# Patient Record
Sex: FEMALE | Race: BLACK OR AFRICAN AMERICAN | Employment: UNEMPLOYED | URBAN - METROPOLITAN AREA
[De-identification: names, ages, dates, MRNs, and addresses within clinical notes are randomized per-mention and may not be internally consistent; named-entity substitution may affect disease eponyms.]

---

## 2020-05-03 ENCOUNTER — HOSPITAL ENCOUNTER (EMERGENCY)
Age: 35
Discharge: HOME | End: 2020-05-03
Payer: COMMERCIAL

## 2020-05-03 ENCOUNTER — APPOINTMENT (OUTPATIENT)
Dept: GENERAL RADIOLOGY | Age: 35
End: 2020-05-03
Payer: COMMERCIAL

## 2020-05-03 VITALS
TEMPERATURE: 98.1 F | OXYGEN SATURATION: 97 % | SYSTOLIC BLOOD PRESSURE: 148 MMHG | BODY MASS INDEX: 38.45 KG/M2 | HEIGHT: 67 IN | WEIGHT: 245 LBS | RESPIRATION RATE: 19 BRPM | DIASTOLIC BLOOD PRESSURE: 84 MMHG | HEART RATE: 88 BPM

## 2020-05-03 PROCEDURE — 29515 APPLICATION SHORT LEG SPLINT: CPT

## 2020-05-03 PROCEDURE — 73060 X-RAY EXAM OF HUMERUS: CPT

## 2020-05-03 PROCEDURE — 99283 EMERGENCY DEPT VISIT LOW MDM: CPT

## 2020-05-03 PROCEDURE — 73630 X-RAY EXAM OF FOOT: CPT

## 2020-05-03 PROCEDURE — 6360000002 HC RX W HCPCS: Performed by: PHYSICIAN ASSISTANT

## 2020-05-03 PROCEDURE — 96372 THER/PROPH/DIAG INJ SC/IM: CPT

## 2020-05-03 PROCEDURE — 73552 X-RAY EXAM OF FEMUR 2/>: CPT

## 2020-05-03 PROCEDURE — 73610 X-RAY EXAM OF ANKLE: CPT

## 2020-05-03 RX ORDER — CYCLOBENZAPRINE HCL 10 MG
10 TABLET ORAL 3 TIMES DAILY PRN
Qty: 21 TABLET | Refills: 0 | Status: SHIPPED | OUTPATIENT
Start: 2020-05-03 | End: 2020-05-13

## 2020-05-03 RX ORDER — ETODOLAC 400 MG/1
400 TABLET, FILM COATED ORAL 2 TIMES DAILY
Qty: 14 TABLET | Refills: 0 | Status: ON HOLD | OUTPATIENT
Start: 2020-05-03 | End: 2021-01-01

## 2020-05-03 RX ORDER — ORPHENADRINE CITRATE 30 MG/ML
60 INJECTION INTRAMUSCULAR; INTRAVENOUS ONCE
Status: COMPLETED | OUTPATIENT
Start: 2020-05-03 | End: 2020-05-03

## 2020-05-03 RX ORDER — KETOROLAC TROMETHAMINE 30 MG/ML
60 INJECTION, SOLUTION INTRAMUSCULAR; INTRAVENOUS ONCE
Status: COMPLETED | OUTPATIENT
Start: 2020-05-03 | End: 2020-05-03

## 2020-05-03 RX ADMIN — ORPHENADRINE CITRATE 60 MG: 30 INJECTION INTRAMUSCULAR; INTRAVENOUS at 16:32

## 2020-05-03 RX ADMIN — KETOROLAC TROMETHAMINE 60 MG: 30 INJECTION, SOLUTION INTRAMUSCULAR at 16:31

## 2020-05-03 ASSESSMENT — ENCOUNTER SYMPTOMS
ABDOMINAL PAIN: 0
EYE PAIN: 0
SHORTNESS OF BREATH: 0
BACK PAIN: 0
TROUBLE SWALLOWING: 0
APNEA: 0
ALLERGIC/IMMUNOLOGIC NEGATIVE: 1
COLOR CHANGE: 0

## 2020-05-03 ASSESSMENT — PAIN SCALES - GENERAL
PAINLEVEL_OUTOF10: 7
PAINLEVEL_OUTOF10: 8

## 2020-05-03 ASSESSMENT — PAIN DESCRIPTION - LOCATION: LOCATION: ARM;BACK;LEG

## 2020-05-03 ASSESSMENT — PAIN DESCRIPTION - ORIENTATION: ORIENTATION: RIGHT

## 2020-05-03 NOTE — ED PROVIDER NOTES
Vitals [05/03/20 1609]   BP Temp Temp Source Pulse Resp SpO2 Height Weight   (!) 148/84 98.1 °F (36.7 °C) Temporal 88 19 97 % 5' 7\" (1.702 m) 245 lb (111.1 kg)       Physical Exam  Vitals signs and nursing note reviewed. Constitutional:       General: She is not in acute distress. Appearance: She is well-developed. She is not diaphoretic. HENT:      Head: Normocephalic and atraumatic. Mouth/Throat:      Pharynx: No oropharyngeal exudate. Eyes:      General: No scleral icterus. Conjunctiva/sclera: Conjunctivae normal.      Pupils: Pupils are equal, round, and reactive to light. Neck:      Musculoskeletal: Normal range of motion and neck supple. Trachea: No tracheal deviation. Cardiovascular:      Rate and Rhythm: Normal rate. Heart sounds: Normal heart sounds. Pulmonary:      Effort: Pulmonary effort is normal. No respiratory distress. Breath sounds: Normal breath sounds. Abdominal:      General: Bowel sounds are normal. There is no distension. Palpations: Abdomen is soft. Musculoskeletal: Normal range of motion. Right ankle: Tenderness. Lateral malleolus and medial malleolus tenderness found. Right upper arm: She exhibits tenderness. She exhibits no deformity. Arms:       Right upper leg: She exhibits tenderness. Legs:       Right foot: Tenderness present. No deformity. Feet:    Skin:     General: Skin is warm and dry. Findings: No erythema or rash. Neurological:      Mental Status: She is alert and oriented to person, place, and time. Cranial Nerves: No cranial nerve deficit. Motor: No abnormal muscle tone. Psychiatric:         Behavior: Behavior normal.         Thought Content:  Thought content normal.         Judgment: Judgment normal.           DIAGNOSTIC RESULTS     RADIOLOGY:   Non-plain film images such as CT, Ultrasound and MRI are read by the radiologist. Plain radiographic images are visualized and

## 2020-05-03 NOTE — ED TRIAGE NOTES
Pt to ER with c/o pain down right side of body after slipping in water and falling at [employer] , pt states pain is 7/10, pt denies hitting head, pt a&ox4, resp even and unlabored

## 2020-12-31 ENCOUNTER — HOSPITAL ENCOUNTER (INPATIENT)
Age: 35
LOS: 3 days | Discharge: HOME | DRG: 812 | End: 2021-01-03
Attending: STUDENT IN AN ORGANIZED HEALTH CARE EDUCATION/TRAINING PROGRAM | Admitting: INTERNAL MEDICINE
Payer: COMMERCIAL

## 2020-12-31 ENCOUNTER — APPOINTMENT (OUTPATIENT)
Dept: CT IMAGING | Age: 35
DRG: 812 | End: 2020-12-31
Payer: COMMERCIAL

## 2020-12-31 ENCOUNTER — APPOINTMENT (OUTPATIENT)
Dept: GENERAL RADIOLOGY | Age: 35
DRG: 812 | End: 2020-12-31
Payer: COMMERCIAL

## 2020-12-31 DIAGNOSIS — E87.20 ACIDEMIA: Primary | ICD-10-CM

## 2020-12-31 DIAGNOSIS — J69.0 ASPIRATION PNEUMONIA OF RIGHT UPPER LOBE, UNSPECIFIED ASPIRATION PNEUMONIA TYPE (HCC): ICD-10-CM

## 2020-12-31 DIAGNOSIS — T58.94XA CARBOXYHEMOGLOBINEMIA, UNDETERMINED INTENT, INITIAL ENCOUNTER: ICD-10-CM

## 2020-12-31 DIAGNOSIS — I95.9 HYPOTENSION, UNSPECIFIED HYPOTENSION TYPE: ICD-10-CM

## 2020-12-31 DIAGNOSIS — R73.9 HYPERGLYCEMIA: ICD-10-CM

## 2020-12-31 DIAGNOSIS — R41.0 CONFUSION: ICD-10-CM

## 2020-12-31 DIAGNOSIS — E66.01 MORBID OBESITY DUE TO EXCESS CALORIES (HCC): ICD-10-CM

## 2020-12-31 DIAGNOSIS — F10.10 ALCOHOL ABUSE: ICD-10-CM

## 2020-12-31 DIAGNOSIS — K92.2 ACUTE UPPER GASTROINTESTINAL BLEEDING: ICD-10-CM

## 2020-12-31 PROBLEM — J96.01 ACUTE RESPIRATORY FAILURE WITH HYPOXIA AND HYPERCARBIA (HCC): Status: ACTIVE | Noted: 2020-12-31

## 2020-12-31 PROBLEM — J96.02 ACUTE RESPIRATORY FAILURE WITH HYPOXIA AND HYPERCARBIA (HCC): Status: ACTIVE | Noted: 2020-12-31

## 2020-12-31 PROBLEM — J96.02 ACUTE RESPIRATORY FAILURE WITH HYPERCAPNIA (HCC): Status: ACTIVE | Noted: 2020-12-31

## 2020-12-31 PROBLEM — F19.10 POLYSUBSTANCE ABUSE (HCC): Status: ACTIVE | Noted: 2020-12-31

## 2020-12-31 LAB
ALBUMIN SERPL-MCNC: 4.2 G/DL (ref 3.5–4.6)
ALP BLD-CCNC: 73 U/L (ref 40–130)
ALT SERPL-CCNC: 19 U/L (ref 0–33)
AMPHETAMINE SCREEN, URINE: ABNORMAL
ANION GAP SERPL CALCULATED.3IONS-SCNC: 21 MEQ/L (ref 9–15)
APTT: 25.4 SEC (ref 24.4–36.8)
AST SERPL-CCNC: 28 U/L (ref 0–35)
BARBITURATE SCREEN URINE: ABNORMAL
BASE EXCESS ARTERIAL: -12 (ref -3–3)
BASE EXCESS ARTERIAL: -2 (ref -3–3)
BASE EXCESS ARTERIAL: -8 (ref -3–3)
BASOPHILS ABSOLUTE: 0.1 K/UL (ref 0–0.2)
BASOPHILS RELATIVE PERCENT: 0.8 %
BENZODIAZEPINE SCREEN, URINE: ABNORMAL
BETA-HYDROXYBUTYRATE: 1.5 MG/DL (ref 0.2–2.8)
BILIRUB SERPL-MCNC: <0.2 MG/DL (ref 0.2–0.7)
BILIRUBIN URINE: NEGATIVE
BLOOD, URINE: NEGATIVE
BUN BLDV-MCNC: 8 MG/DL (ref 6–20)
CALCIUM IONIZED: 1.06 MMOL/L (ref 1.12–1.32)
CALCIUM IONIZED: 1.08 MMOL/L (ref 1.12–1.32)
CALCIUM IONIZED: 1.16 MMOL/L (ref 1.12–1.32)
CALCIUM SERPL-MCNC: 8.5 MG/DL (ref 8.5–9.9)
CANNABINOID SCREEN URINE: ABNORMAL
CARBOXYHEMOGLOBIN: 6.9 % (ref 0–4)
CHLORIDE BLD-SCNC: 97 MEQ/L (ref 95–107)
CK MB: 2.7 NG/ML (ref 0–3.8)
CLARITY: CLEAR
CO2: 17 MEQ/L (ref 20–31)
COCAINE METABOLITE SCREEN URINE: POSITIVE
COLOR: YELLOW
CREAT SERPL-MCNC: 1.26 MG/DL (ref 0.5–0.9)
CREATINE KINASE-MB INDEX: 1.5 % (ref 0–3.5)
EKG ATRIAL RATE: 100 BPM
EKG P AXIS: 57 DEGREES
EKG P-R INTERVAL: 180 MS
EKG Q-T INTERVAL: 386 MS
EKG QRS DURATION: 106 MS
EKG QTC CALCULATION (BAZETT): 497 MS
EKG R AXIS: 58 DEGREES
EKG T AXIS: 124 DEGREES
EKG VENTRICULAR RATE: 100 BPM
EOSINOPHILS ABSOLUTE: 0.2 K/UL (ref 0–0.7)
EOSINOPHILS RELATIVE PERCENT: 1.3 %
ETHANOL PERCENT: 0.07 G/DL
ETHANOL: 86 MG/DL (ref 0–0.08)
GFR AFRICAN AMERICAN: 58.3
GFR AFRICAN AMERICAN: >60
GFR NON-AFRICAN AMERICAN: 48.2
GFR NON-AFRICAN AMERICAN: 51
GFR NON-AFRICAN AMERICAN: >60
GFR NON-AFRICAN AMERICAN: >60
GLOBULIN: 3.2 G/DL (ref 2.3–3.5)
GLUCOSE BLD-MCNC: 298 MG/DL (ref 60–115)
GLUCOSE BLD-MCNC: 364 MG/DL (ref 70–99)
GLUCOSE BLD-MCNC: 62 MG/DL (ref 60–115)
GLUCOSE BLD-MCNC: 79 MG/DL (ref 60–115)
GLUCOSE BLD-MCNC: 82 MG/DL (ref 60–115)
GLUCOSE URINE: 500 MG/DL
HCG, URINE, POC: NEGATIVE
HCO3 ARTERIAL: 15.7 MMOL/L (ref 21–29)
HCO3 ARTERIAL: 21.4 MMOL/L (ref 21–29)
HCO3 ARTERIAL: 25.9 MMOL/L (ref 21–29)
HCT VFR BLD CALC: 39 % (ref 37–47)
HEMOGLOBIN: 12.2 G/DL (ref 12–16)
HEMOGLOBIN: 13.1 GM/DL (ref 12–16)
HEMOGLOBIN: 14 GM/DL (ref 12–16)
HEMOGLOBIN: 14.5 GM/DL (ref 12–16)
INR BLD: 1.1
KETONES, URINE: NEGATIVE MG/DL
LACTATE: 1.95 MMOL/L (ref 0.4–2)
LACTATE: 4.37 MMOL/L (ref 0.4–2)
LACTATE: 8.27 MMOL/L (ref 0.4–2)
LEUKOCYTE ESTERASE, URINE: NEGATIVE
LYMPHOCYTES ABSOLUTE: 4.1 K/UL (ref 1–4.8)
LYMPHOCYTES RELATIVE PERCENT: 34.6 %
Lab: ABNORMAL
Lab: NORMAL
MAGNESIUM: 2.4 MG/DL (ref 1.7–2.4)
MCH RBC QN AUTO: 29 PG (ref 27–31.3)
MCHC RBC AUTO-ENTMCNC: 31.3 % (ref 33–37)
MCV RBC AUTO: 92.7 FL (ref 82–100)
METHADONE SCREEN, URINE: ABNORMAL
MONOCYTES ABSOLUTE: 0.6 K/UL (ref 0.2–0.8)
MONOCYTES RELATIVE PERCENT: 4.9 %
NEGATIVE QC PASS/FAIL: NORMAL
NEUTROPHILS ABSOLUTE: 6.9 K/UL (ref 1.4–6.5)
NEUTROPHILS RELATIVE PERCENT: 58.4 %
NITRITE, URINE: NEGATIVE
O2 SAT, ARTERIAL: 100 % (ref 93–100)
O2 SAT, ARTERIAL: 90 % (ref 93–100)
O2 SAT, ARTERIAL: 99 % (ref 93–100)
OCCULT BLOOD, OTHER: POSITIVE
OPIATE SCREEN URINE: ABNORMAL
OXYCODONE URINE: ABNORMAL
PCO2 ARTERIAL: 40 MM HG (ref 35–45)
PCO2 ARTERIAL: 62 MM HG (ref 35–45)
PCO2 ARTERIAL: 71 MM HG (ref 35–45)
PDW BLD-RTO: 14.6 % (ref 11.5–14.5)
PERFORMED ON: ABNORMAL
PERFORMED ON: NORMAL
PH ARTERIAL: 7.09 (ref 7.35–7.45)
PH ARTERIAL: 7.2 (ref 7.35–7.45)
PH ARTERIAL: 7.23 (ref 7.35–7.45)
PH UA: 5 (ref 5–9)
PHENCYCLIDINE SCREEN URINE: POSITIVE
PLATELET # BLD: 315 K/UL (ref 130–400)
PO2 ARTERIAL: 166 MM HG (ref 75–108)
PO2 ARTERIAL: 276 MM HG (ref 75–108)
PO2 ARTERIAL: 72 MM HG (ref 75–108)
POC CHLORIDE: 105 MEQ/L (ref 99–110)
POC CHLORIDE: 107 MEQ/L (ref 99–110)
POC CHLORIDE: 109 MEQ/L (ref 99–110)
POC CREATININE: 0.8 MG/DL (ref 0.6–1.1)
POC CREATININE: 1 MG/DL (ref 0.6–1.1)
POC CREATININE: 1.2 MG/DL (ref 0.6–1.1)
POC FIO2: 100
POC FIO2: 100
POC FIO2: 4
POC HEMATOCRIT: 38 % (ref 36–48)
POC HEMATOCRIT: 41 % (ref 36–48)
POC HEMATOCRIT: 43 % (ref 36–48)
POC POTASSIUM: 3.7 MEQ/L (ref 3.5–5.1)
POC POTASSIUM: 3.9 MEQ/L (ref 3.5–5.1)
POC POTASSIUM: 4.9 MEQ/L (ref 3.5–5.1)
POC SAMPLE TYPE: ABNORMAL
POC SODIUM: 140 MEQ/L (ref 136–145)
POC SODIUM: 141 MEQ/L (ref 136–145)
POC SODIUM: 143 MEQ/L (ref 136–145)
POSITIVE QC PASS/FAIL: NORMAL
POTASSIUM SERPL-SCNC: 3.8 MEQ/L (ref 3.4–4.9)
PRO-BNP: 27 PG/ML
PROCALCITONIN: 0.03 NG/ML (ref 0–0.15)
PROLACTIN: 33.1 NG/ML
PROPOXYPHENE SCREEN: ABNORMAL
PROTEIN UA: NEGATIVE MG/DL
PROTHROMBIN TIME: 14.6 SEC (ref 12.3–14.9)
RBC # BLD: 4.21 M/UL (ref 4.2–5.4)
REJECTED TEST: NORMAL
SARS-COV-2, NAAT: NOT DETECTED
SODIUM BLD-SCNC: 135 MEQ/L (ref 135–144)
SPECIFIC GRAVITY UA: 1.01 (ref 1–1.03)
TCO2 ARTERIAL: 17 (ref 22–29)
TCO2 ARTERIAL: 24 (ref 22–29)
TCO2 ARTERIAL: 28 (ref 22–29)
TOTAL CK: 179 U/L (ref 0–170)
TOTAL PROTEIN: 7.4 G/DL (ref 6.3–8)
TROPONIN: <0.01 NG/ML (ref 0–0.01)
URINE REFLEX TO CULTURE: ABNORMAL
UROBILINOGEN, URINE: 0.2 E.U./DL
WBC # BLD: 11.9 K/UL (ref 4.8–10.8)

## 2020-12-31 PROCEDURE — 94002 VENT MGMT INPAT INIT DAY: CPT

## 2020-12-31 PROCEDURE — 36415 COLL VENOUS BLD VENIPUNCTURE: CPT

## 2020-12-31 PROCEDURE — 6360000002 HC RX W HCPCS: Performed by: STUDENT IN AN ORGANIZED HEALTH CARE EDUCATION/TRAINING PROGRAM

## 2020-12-31 PROCEDURE — U0002 COVID-19 LAB TEST NON-CDC: HCPCS

## 2020-12-31 PROCEDURE — 0BH17EZ INSERTION OF ENDOTRACHEAL AIRWAY INTO TRACHEA, VIA NATURAL OR ARTIFICIAL OPENING: ICD-10-PCS | Performed by: STUDENT IN AN ORGANIZED HEALTH CARE EDUCATION/TRAINING PROGRAM

## 2020-12-31 PROCEDURE — 2000000000 HC ICU R&B

## 2020-12-31 PROCEDURE — 85014 HEMATOCRIT: CPT

## 2020-12-31 PROCEDURE — 85610 PROTHROMBIN TIME: CPT

## 2020-12-31 PROCEDURE — 5A1935Z RESPIRATORY VENTILATION, LESS THAN 24 CONSECUTIVE HOURS: ICD-10-PCS | Performed by: STUDENT IN AN ORGANIZED HEALTH CARE EDUCATION/TRAINING PROGRAM

## 2020-12-31 PROCEDURE — 82271 OCCULT BLOOD OTHER SOURCES: CPT

## 2020-12-31 PROCEDURE — 99284 EMERGENCY DEPT VISIT MOD MDM: CPT

## 2020-12-31 PROCEDURE — 82803 BLOOD GASES ANY COMBINATION: CPT

## 2020-12-31 PROCEDURE — 96375 TX/PRO/DX INJ NEW DRUG ADDON: CPT

## 2020-12-31 PROCEDURE — 2580000003 HC RX 258: Performed by: EMERGENCY MEDICINE

## 2020-12-31 PROCEDURE — 71275 CT ANGIOGRAPHY CHEST: CPT

## 2020-12-31 PROCEDURE — 51701 INSERT BLADDER CATHETER: CPT

## 2020-12-31 PROCEDURE — 82550 ASSAY OF CK (CPK): CPT

## 2020-12-31 PROCEDURE — 83735 ASSAY OF MAGNESIUM: CPT

## 2020-12-31 PROCEDURE — 83880 ASSAY OF NATRIURETIC PEPTIDE: CPT

## 2020-12-31 PROCEDURE — 82375 ASSAY CARBOXYHB QUANT: CPT

## 2020-12-31 PROCEDURE — C9113 INJ PANTOPRAZOLE SODIUM, VIA: HCPCS | Performed by: STUDENT IN AN ORGANIZED HEALTH CARE EDUCATION/TRAINING PROGRAM

## 2020-12-31 PROCEDURE — 84146 ASSAY OF PROLACTIN: CPT

## 2020-12-31 PROCEDURE — 2500000003 HC RX 250 WO HCPCS: Performed by: STUDENT IN AN ORGANIZED HEALTH CARE EDUCATION/TRAINING PROGRAM

## 2020-12-31 PROCEDURE — 84132 ASSAY OF SERUM POTASSIUM: CPT

## 2020-12-31 PROCEDURE — 6360000002 HC RX W HCPCS: Performed by: EMERGENCY MEDICINE

## 2020-12-31 PROCEDURE — 82565 ASSAY OF CREATININE: CPT

## 2020-12-31 PROCEDURE — 2580000003 HC RX 258: Performed by: STUDENT IN AN ORGANIZED HEALTH CARE EDUCATION/TRAINING PROGRAM

## 2020-12-31 PROCEDURE — 71045 X-RAY EXAM CHEST 1 VIEW: CPT

## 2020-12-31 PROCEDURE — 80307 DRUG TEST PRSMV CHEM ANLYZR: CPT

## 2020-12-31 PROCEDURE — 96367 TX/PROPH/DG ADDL SEQ IV INF: CPT

## 2020-12-31 PROCEDURE — 81003 URINALYSIS AUTO W/O SCOPE: CPT

## 2020-12-31 PROCEDURE — 85025 COMPLETE CBC W/AUTO DIFF WBC: CPT

## 2020-12-31 PROCEDURE — 84484 ASSAY OF TROPONIN QUANT: CPT

## 2020-12-31 PROCEDURE — 82553 CREATINE MB FRACTION: CPT

## 2020-12-31 PROCEDURE — 83605 ASSAY OF LACTIC ACID: CPT

## 2020-12-31 PROCEDURE — G0480 DRUG TEST DEF 1-7 CLASSES: HCPCS

## 2020-12-31 PROCEDURE — 82330 ASSAY OF CALCIUM: CPT

## 2020-12-31 PROCEDURE — 6360000004 HC RX CONTRAST MEDICATION: Performed by: EMERGENCY MEDICINE

## 2020-12-31 PROCEDURE — 84145 PROCALCITONIN (PCT): CPT

## 2020-12-31 PROCEDURE — 93005 ELECTROCARDIOGRAM TRACING: CPT

## 2020-12-31 PROCEDURE — 31500 INSERT EMERGENCY AIRWAY: CPT

## 2020-12-31 PROCEDURE — 84295 ASSAY OF SERUM SODIUM: CPT

## 2020-12-31 PROCEDURE — 80053 COMPREHEN METABOLIC PANEL: CPT

## 2020-12-31 PROCEDURE — 36600 WITHDRAWAL OF ARTERIAL BLOOD: CPT

## 2020-12-31 PROCEDURE — 82435 ASSAY OF BLOOD CHLORIDE: CPT

## 2020-12-31 PROCEDURE — 82010 KETONE BODYS QUAN: CPT

## 2020-12-31 PROCEDURE — 96365 THER/PROPH/DIAG IV INF INIT: CPT

## 2020-12-31 PROCEDURE — 51702 INSERT TEMP BLADDER CATH: CPT

## 2020-12-31 PROCEDURE — 85730 THROMBOPLASTIN TIME PARTIAL: CPT

## 2020-12-31 PROCEDURE — 94660 CPAP INITIATION&MGMT: CPT

## 2020-12-31 RX ORDER — NALOXONE HYDROCHLORIDE 0.4 MG/ML
0.4 INJECTION, SOLUTION INTRAMUSCULAR; INTRAVENOUS; SUBCUTANEOUS ONCE
Status: DISCONTINUED | OUTPATIENT
Start: 2020-12-31 | End: 2021-01-03 | Stop reason: HOSPADM

## 2020-12-31 RX ORDER — FLUMAZENIL 0.1 MG/ML
0.5 INJECTION, SOLUTION INTRAVENOUS ONCE
Status: DISCONTINUED | OUTPATIENT
Start: 2020-12-31 | End: 2021-01-03 | Stop reason: HOSPADM

## 2020-12-31 RX ORDER — 0.9 % SODIUM CHLORIDE 0.9 %
1000 INTRAVENOUS SOLUTION INTRAVENOUS ONCE
Status: COMPLETED | OUTPATIENT
Start: 2020-12-31 | End: 2020-12-31

## 2020-12-31 RX ORDER — KETAMINE HYDROCHLORIDE 100 MG/ML
400 INJECTION, SOLUTION INTRAMUSCULAR; INTRAVENOUS ONCE
Status: COMPLETED | OUTPATIENT
Start: 2020-12-31 | End: 2020-12-31

## 2020-12-31 RX ORDER — ROCURONIUM BROMIDE 10 MG/ML
100 INJECTION, SOLUTION INTRAVENOUS ONCE
Status: COMPLETED | OUTPATIENT
Start: 2020-12-31 | End: 2020-12-31

## 2020-12-31 RX ORDER — PROPOFOL 10 MG/ML
100 INJECTION, EMULSION INTRAVENOUS ONCE
Status: DISCONTINUED | OUTPATIENT
Start: 2020-12-31 | End: 2021-01-01

## 2020-12-31 RX ORDER — PROPOFOL 10 MG/ML
10 INJECTION, EMULSION INTRAVENOUS ONCE
Status: COMPLETED | OUTPATIENT
Start: 2020-12-31 | End: 2020-12-31

## 2020-12-31 RX ORDER — LORAZEPAM 2 MG/ML
2 INJECTION INTRAMUSCULAR ONCE
Status: COMPLETED | OUTPATIENT
Start: 2020-12-31 | End: 2020-12-31

## 2020-12-31 RX ADMIN — PROPOFOL 30 MCG/KG/MIN: 10 INJECTION, EMULSION INTRAVENOUS at 22:19

## 2020-12-31 RX ADMIN — LORAZEPAM 2 MG: 2 INJECTION INTRAMUSCULAR; INTRAVENOUS at 21:33

## 2020-12-31 RX ADMIN — SODIUM CHLORIDE 1000 ML: 9 INJECTION, SOLUTION INTRAVENOUS at 18:42

## 2020-12-31 RX ADMIN — KETAMINE HYDROCHLORIDE 400 MG: 100 INJECTION INTRAMUSCULAR; INTRAVENOUS at 18:38

## 2020-12-31 RX ADMIN — SODIUM CHLORIDE 1000 ML: 9 INJECTION, SOLUTION INTRAVENOUS at 21:19

## 2020-12-31 RX ADMIN — SODIUM CHLORIDE 1000 ML: 9 INJECTION, SOLUTION INTRAVENOUS at 18:25

## 2020-12-31 RX ADMIN — SODIUM BICARBONATE 25 MEQ: 84 INJECTION, SOLUTION INTRAVENOUS at 20:01

## 2020-12-31 RX ADMIN — SODIUM BICARBONATE 50 MEQ: 84 INJECTION, SOLUTION INTRAVENOUS at 19:48

## 2020-12-31 RX ADMIN — PROPOFOL 10 MCG/KG/MIN: 10 INJECTION, EMULSION INTRAVENOUS at 20:29

## 2020-12-31 RX ADMIN — ROCURONIUM BROMIDE 100 MG: 10 INJECTION, SOLUTION INTRAVENOUS at 18:41

## 2020-12-31 RX ADMIN — PANTOPRAZOLE SODIUM 80 MG: 40 INJECTION, POWDER, FOR SOLUTION INTRAVENOUS at 19:48

## 2020-12-31 RX ADMIN — PIPERACILLIN AND TAZOBACTAM 3.38 G: 3; .375 INJECTION, POWDER, LYOPHILIZED, FOR SOLUTION INTRAVENOUS at 21:56

## 2020-12-31 RX ADMIN — IOPAMIDOL 100 ML: 612 INJECTION, SOLUTION INTRAVENOUS at 21:01

## 2020-12-31 ASSESSMENT — PULMONARY FUNCTION TESTS: PIF_VALUE: 28

## 2020-12-31 ASSESSMENT — PAIN SCALES - GENERAL: PAINLEVEL_OUTOF10: 0

## 2021-01-01 ENCOUNTER — APPOINTMENT (OUTPATIENT)
Dept: GENERAL RADIOLOGY | Age: 36
DRG: 812 | End: 2021-01-01
Payer: COMMERCIAL

## 2021-01-01 LAB
ANION GAP SERPL CALCULATED.3IONS-SCNC: 18 MEQ/L (ref 9–15)
BASE EXCESS ARTERIAL: 1 (ref -3–3)
BASE EXCESS ARTERIAL: 2 (ref -3–3)
BUN BLDV-MCNC: 6 MG/DL (ref 6–20)
CALCIUM IONIZED: 1.06 MMOL/L (ref 1.12–1.32)
CALCIUM IONIZED: 1.13 MMOL/L (ref 1.12–1.32)
CALCIUM SERPL-MCNC: 8.7 MG/DL (ref 8.5–9.9)
CHLORIDE BLD-SCNC: 101 MEQ/L (ref 95–107)
CO2: 19 MEQ/L (ref 20–31)
CREAT SERPL-MCNC: 0.68 MG/DL (ref 0.5–0.9)
GFR AFRICAN AMERICAN: >60
GFR NON-AFRICAN AMERICAN: >60
GLUCOSE BLD-MCNC: 108 MG/DL (ref 60–115)
GLUCOSE BLD-MCNC: 213 MG/DL (ref 60–115)
GLUCOSE BLD-MCNC: 244 MG/DL (ref 60–115)
GLUCOSE BLD-MCNC: 70 MG/DL (ref 70–99)
GLUCOSE BLD-MCNC: 79 MG/DL (ref 60–115)
GLUCOSE BLD-MCNC: 85 MG/DL (ref 60–115)
GLUCOSE BLD-MCNC: 87 MG/DL (ref 60–115)
HBA1C MFR BLD: 6.3 % (ref 4.8–5.9)
HCO3 ARTERIAL: 24.8 MMOL/L (ref 21–29)
HCO3 ARTERIAL: 26.9 MMOL/L (ref 21–29)
HCT VFR BLD CALC: 38.9 % (ref 37–47)
HEMOGLOBIN: 12.2 G/DL (ref 12–16)
HEMOGLOBIN: 12.8 GM/DL (ref 12–16)
HEMOGLOBIN: 13.5 GM/DL (ref 12–16)
IRON SATURATION: 8 % (ref 11–46)
IRON: 35 UG/DL (ref 37–145)
LACTATE: 0.81 MMOL/L (ref 0.4–2)
LACTATE: 0.85 MMOL/L (ref 0.4–2)
LACTIC ACID: 2.7 MMOL/L (ref 0.5–2.2)
MCH RBC QN AUTO: 28.8 PG (ref 27–31.3)
MCHC RBC AUTO-ENTMCNC: 31.4 % (ref 33–37)
MCV RBC AUTO: 91.9 FL (ref 82–100)
O2 SAT, ARTERIAL: 100 % (ref 93–100)
O2 SAT, ARTERIAL: 94 % (ref 93–100)
PCO2 ARTERIAL: 37 MM HG (ref 35–45)
PCO2 ARTERIAL: 45 MM HG (ref 35–45)
PDW BLD-RTO: 14.7 % (ref 11.5–14.5)
PERFORMED ON: ABNORMAL
PERFORMED ON: NORMAL
PERFORMED ON: NORMAL
PH ARTERIAL: 7.39 (ref 7.35–7.45)
PH ARTERIAL: 7.44 (ref 7.35–7.45)
PLATELET # BLD: 302 K/UL (ref 130–400)
PO2 ARTERIAL: 167 MM HG (ref 75–108)
PO2 ARTERIAL: 72 MM HG (ref 75–108)
POC CHLORIDE: 106 MEQ/L (ref 99–110)
POC CHLORIDE: 107 MEQ/L (ref 99–110)
POC CREATININE: 0.8 MG/DL (ref 0.6–1.1)
POC CREATININE: 0.9 MG/DL (ref 0.6–1.1)
POC FIO2: 35
POC FIO2: 60
POC HEMATOCRIT: 38 % (ref 36–48)
POC HEMATOCRIT: 40 % (ref 36–48)
POC POTASSIUM: 3.5 MEQ/L (ref 3.5–5.1)
POC POTASSIUM: 3.6 MEQ/L (ref 3.5–5.1)
POC SAMPLE TYPE: ABNORMAL
POC SAMPLE TYPE: ABNORMAL
POC SODIUM: 141 MEQ/L (ref 136–145)
POC SODIUM: 141 MEQ/L (ref 136–145)
POTASSIUM REFLEX MAGNESIUM: 4.4 MEQ/L (ref 3.4–4.9)
RBC # BLD: 4.23 M/UL (ref 4.2–5.4)
SODIUM BLD-SCNC: 138 MEQ/L (ref 135–144)
TCO2 ARTERIAL: 26 (ref 22–29)
TCO2 ARTERIAL: 28 (ref 22–29)
TOTAL IRON BINDING CAPACITY: 415 UG/DL (ref 178–450)
WBC # BLD: 14.5 K/UL (ref 4.8–10.8)

## 2021-01-01 PROCEDURE — 87205 SMEAR GRAM STAIN: CPT

## 2021-01-01 PROCEDURE — 6370000000 HC RX 637 (ALT 250 FOR IP): Performed by: INTERNAL MEDICINE

## 2021-01-01 PROCEDURE — 71045 X-RAY EXAM CHEST 1 VIEW: CPT

## 2021-01-01 PROCEDURE — 36600 WITHDRAWAL OF ARTERIAL BLOOD: CPT

## 2021-01-01 PROCEDURE — 94003 VENT MGMT INPAT SUBQ DAY: CPT

## 2021-01-01 PROCEDURE — 1210000000 HC MED SURG R&B

## 2021-01-01 PROCEDURE — 83550 IRON BINDING TEST: CPT

## 2021-01-01 PROCEDURE — 85027 COMPLETE CBC AUTOMATED: CPT

## 2021-01-01 PROCEDURE — 87070 CULTURE OTHR SPECIMN AEROBIC: CPT

## 2021-01-01 PROCEDURE — 99223 1ST HOSP IP/OBS HIGH 75: CPT | Performed by: INTERNAL MEDICINE

## 2021-01-01 PROCEDURE — 83540 ASSAY OF IRON: CPT

## 2021-01-01 PROCEDURE — 82565 ASSAY OF CREATININE: CPT

## 2021-01-01 PROCEDURE — 2580000003 HC RX 258: Performed by: INTERNAL MEDICINE

## 2021-01-01 PROCEDURE — C9113 INJ PANTOPRAZOLE SODIUM, VIA: HCPCS | Performed by: INTERNAL MEDICINE

## 2021-01-01 PROCEDURE — 83036 HEMOGLOBIN GLYCOSYLATED A1C: CPT

## 2021-01-01 PROCEDURE — 6360000002 HC RX W HCPCS: Performed by: INTERNAL MEDICINE

## 2021-01-01 PROCEDURE — 89220 SPUTUM SPECIMEN COLLECTION: CPT

## 2021-01-01 PROCEDURE — 94640 AIRWAY INHALATION TREATMENT: CPT

## 2021-01-01 PROCEDURE — 80048 BASIC METABOLIC PNL TOTAL CA: CPT

## 2021-01-01 PROCEDURE — 82803 BLOOD GASES ANY COMBINATION: CPT

## 2021-01-01 PROCEDURE — 85014 HEMATOCRIT: CPT

## 2021-01-01 PROCEDURE — 82435 ASSAY OF BLOOD CHLORIDE: CPT

## 2021-01-01 PROCEDURE — APPNB60 APP NON BILLABLE TIME 46-60 MINS: Performed by: NURSE PRACTITIONER

## 2021-01-01 PROCEDURE — 82948 REAGENT STRIP/BLOOD GLUCOSE: CPT

## 2021-01-01 PROCEDURE — 99253 IP/OBS CNSLTJ NEW/EST LOW 45: CPT | Performed by: INTERNAL MEDICINE

## 2021-01-01 PROCEDURE — 84295 ASSAY OF SERUM SODIUM: CPT

## 2021-01-01 PROCEDURE — 2500000003 HC RX 250 WO HCPCS: Performed by: INTERNAL MEDICINE

## 2021-01-01 PROCEDURE — 83605 ASSAY OF LACTIC ACID: CPT

## 2021-01-01 PROCEDURE — 84132 ASSAY OF SERUM POTASSIUM: CPT

## 2021-01-01 PROCEDURE — 82330 ASSAY OF CALCIUM: CPT

## 2021-01-01 PROCEDURE — 36415 COLL VENOUS BLD VENIPUNCTURE: CPT

## 2021-01-01 RX ORDER — ALBUTEROL SULFATE 90 UG/1
2 AEROSOL, METERED RESPIRATORY (INHALATION) EVERY 6 HOURS
Status: DISCONTINUED | OUTPATIENT
Start: 2021-01-01 | End: 2021-01-02

## 2021-01-01 RX ORDER — ONDANSETRON 2 MG/ML
4 INJECTION INTRAMUSCULAR; INTRAVENOUS EVERY 6 HOURS PRN
Status: DISCONTINUED | OUTPATIENT
Start: 2021-01-01 | End: 2021-01-01 | Stop reason: SDUPTHER

## 2021-01-01 RX ORDER — DEXTROSE MONOHYDRATE 25 G/50ML
12.5 INJECTION, SOLUTION INTRAVENOUS PRN
Status: DISCONTINUED | OUTPATIENT
Start: 2021-01-01 | End: 2021-01-03 | Stop reason: HOSPADM

## 2021-01-01 RX ORDER — PANTOPRAZOLE SODIUM 40 MG/10ML
40 INJECTION, POWDER, LYOPHILIZED, FOR SOLUTION INTRAVENOUS DAILY
Status: DISCONTINUED | OUTPATIENT
Start: 2021-01-04 | End: 2021-01-01

## 2021-01-01 RX ORDER — POLYETHYLENE GLYCOL 3350 17 G/17G
17 POWDER, FOR SOLUTION ORAL DAILY PRN
Status: DISCONTINUED | OUTPATIENT
Start: 2021-01-01 | End: 2021-01-03 | Stop reason: HOSPADM

## 2021-01-01 RX ORDER — PROMETHAZINE HYDROCHLORIDE 12.5 MG/1
12.5 TABLET ORAL EVERY 6 HOURS PRN
Status: DISCONTINUED | OUTPATIENT
Start: 2021-01-01 | End: 2021-01-03 | Stop reason: HOSPADM

## 2021-01-01 RX ORDER — ONDANSETRON 2 MG/ML
4 INJECTION INTRAMUSCULAR; INTRAVENOUS EVERY 6 HOURS PRN
Status: DISCONTINUED | OUTPATIENT
Start: 2021-01-01 | End: 2021-01-03 | Stop reason: HOSPADM

## 2021-01-01 RX ORDER — ACETAMINOPHEN 650 MG/1
650 SUPPOSITORY RECTAL EVERY 6 HOURS PRN
Status: DISCONTINUED | OUTPATIENT
Start: 2021-01-01 | End: 2021-01-03 | Stop reason: HOSPADM

## 2021-01-01 RX ORDER — SODIUM CHLORIDE 9 MG/ML
10 INJECTION INTRAVENOUS DAILY
Status: DISCONTINUED | OUTPATIENT
Start: 2021-01-01 | End: 2021-01-01

## 2021-01-01 RX ORDER — SODIUM CHLORIDE 0.9 % (FLUSH) 0.9 %
10 SYRINGE (ML) INJECTION EVERY 12 HOURS SCHEDULED
Status: DISCONTINUED | OUTPATIENT
Start: 2021-01-01 | End: 2021-01-03 | Stop reason: HOSPADM

## 2021-01-01 RX ORDER — SODIUM CHLORIDE 9 MG/ML
10 INJECTION INTRAVENOUS DAILY
Status: DISCONTINUED | OUTPATIENT
Start: 2021-01-04 | End: 2021-01-01

## 2021-01-01 RX ORDER — SODIUM CHLORIDE 0.9 % (FLUSH) 0.9 %
10 SYRINGE (ML) INJECTION PRN
Status: DISCONTINUED | OUTPATIENT
Start: 2021-01-01 | End: 2021-01-03 | Stop reason: HOSPADM

## 2021-01-01 RX ORDER — PANTOPRAZOLE SODIUM 40 MG/1
40 TABLET, DELAYED RELEASE ORAL
Status: DISCONTINUED | OUTPATIENT
Start: 2021-01-02 | End: 2021-01-03 | Stop reason: HOSPADM

## 2021-01-01 RX ORDER — PROMETHAZINE HYDROCHLORIDE 12.5 MG/1
12.5 TABLET ORAL EVERY 6 HOURS PRN
Status: DISCONTINUED | OUTPATIENT
Start: 2021-01-01 | End: 2021-01-01 | Stop reason: SDUPTHER

## 2021-01-01 RX ORDER — PANTOPRAZOLE SODIUM 40 MG/10ML
40 INJECTION, POWDER, LYOPHILIZED, FOR SOLUTION INTRAVENOUS DAILY
Status: DISCONTINUED | OUTPATIENT
Start: 2021-01-01 | End: 2021-01-01

## 2021-01-01 RX ORDER — ACETAMINOPHEN 325 MG/1
650 TABLET ORAL EVERY 6 HOURS PRN
Status: DISCONTINUED | OUTPATIENT
Start: 2021-01-01 | End: 2021-01-03 | Stop reason: HOSPADM

## 2021-01-01 RX ORDER — METHYLPREDNISOLONE SODIUM SUCCINATE 40 MG/ML
40 INJECTION, POWDER, LYOPHILIZED, FOR SOLUTION INTRAMUSCULAR; INTRAVENOUS DAILY
Status: DISCONTINUED | OUTPATIENT
Start: 2021-01-01 | End: 2021-01-01

## 2021-01-01 RX ORDER — NICOTINE POLACRILEX 4 MG
15 LOZENGE BUCCAL PRN
Status: DISCONTINUED | OUTPATIENT
Start: 2021-01-01 | End: 2021-01-03 | Stop reason: HOSPADM

## 2021-01-01 RX ORDER — DEXTROSE MONOHYDRATE 50 MG/ML
100 INJECTION, SOLUTION INTRAVENOUS PRN
Status: DISCONTINUED | OUTPATIENT
Start: 2021-01-01 | End: 2021-01-03 | Stop reason: HOSPADM

## 2021-01-01 RX ORDER — PROPOFOL 10 MG/ML
10 INJECTION, EMULSION INTRAVENOUS
Status: DISCONTINUED | OUTPATIENT
Start: 2021-01-01 | End: 2021-01-01

## 2021-01-01 RX ORDER — SODIUM CHLORIDE 9 MG/ML
INJECTION, SOLUTION INTRAVENOUS CONTINUOUS
Status: DISCONTINUED | OUTPATIENT
Start: 2021-01-01 | End: 2021-01-01

## 2021-01-01 RX ORDER — ALBUTEROL SULFATE 90 UG/1
4 AEROSOL, METERED RESPIRATORY (INHALATION) EVERY 6 HOURS
Status: DISCONTINUED | OUTPATIENT
Start: 2021-01-01 | End: 2021-01-01

## 2021-01-01 RX ADMIN — SODIUM CHLORIDE 8 MG/HR: 9 INJECTION, SOLUTION INTRAVENOUS at 01:28

## 2021-01-01 RX ADMIN — ALBUTEROL SULFATE 4 PUFF: 90 AEROSOL, METERED RESPIRATORY (INHALATION) at 16:05

## 2021-01-01 RX ADMIN — ACETAMINOPHEN 650 MG: 650 SUPPOSITORY RECTAL at 03:54

## 2021-01-01 RX ADMIN — Medication 10 ML: at 22:36

## 2021-01-01 RX ADMIN — PIPERACILLIN AND TAZOBACTAM 3.38 G: 3; .375 INJECTION, POWDER, LYOPHILIZED, FOR SOLUTION INTRAVENOUS at 22:35

## 2021-01-01 RX ADMIN — IPRATROPIUM BROMIDE 4 PUFF: 17 AEROSOL, METERED RESPIRATORY (INHALATION) at 16:05

## 2021-01-01 RX ADMIN — PROPOFOL 40 MCG/KG/MIN: 10 INJECTION, EMULSION INTRAVENOUS at 04:43

## 2021-01-01 RX ADMIN — SODIUM CHLORIDE 0.3 MCG/KG/HR: 9 INJECTION, SOLUTION INTRAVENOUS at 06:00

## 2021-01-01 RX ADMIN — SODIUM CHLORIDE 0.2 MCG/KG/HR: 9 INJECTION, SOLUTION INTRAVENOUS at 01:28

## 2021-01-01 RX ADMIN — IPRATROPIUM BROMIDE 4 PUFF: 17 AEROSOL, METERED RESPIRATORY (INHALATION) at 07:45

## 2021-01-01 RX ADMIN — PIPERACILLIN AND TAZOBACTAM 3.38 G: 3; .375 INJECTION, POWDER, LYOPHILIZED, FOR SOLUTION INTRAVENOUS at 06:26

## 2021-01-01 RX ADMIN — ACETAMINOPHEN 650 MG: 325 TABLET, FILM COATED ORAL at 09:57

## 2021-01-01 RX ADMIN — IPRATROPIUM BROMIDE 4 PUFF: 17 AEROSOL, METERED RESPIRATORY (INHALATION) at 04:53

## 2021-01-01 RX ADMIN — PROPOFOL 50 MCG/KG/MIN: 10 INJECTION, EMULSION INTRAVENOUS at 01:27

## 2021-01-01 RX ADMIN — SODIUM CHLORIDE: 9 INJECTION, SOLUTION INTRAVENOUS at 01:29

## 2021-01-01 RX ADMIN — ALBUTEROL SULFATE 4 PUFF: 90 AEROSOL, METERED RESPIRATORY (INHALATION) at 04:53

## 2021-01-01 RX ADMIN — ALBUTEROL SULFATE 4 PUFF: 90 AEROSOL, METERED RESPIRATORY (INHALATION) at 07:45

## 2021-01-01 RX ADMIN — PIPERACILLIN AND TAZOBACTAM 3.38 G: 3; .375 INJECTION, POWDER, LYOPHILIZED, FOR SOLUTION INTRAVENOUS at 14:13

## 2021-01-01 ASSESSMENT — PAIN SCALES - GENERAL
PAINLEVEL_OUTOF10: 0

## 2021-01-01 ASSESSMENT — PULMONARY FUNCTION TESTS
PIF_VALUE: 32
PIF_VALUE: 34
PIF_VALUE: 31
PIF_VALUE: 29
PIF_VALUE: 46
PIF_VALUE: 35
PIF_VALUE: 30
PIF_VALUE: 35
PIF_VALUE: 36

## 2021-01-01 NOTE — ED NOTES
Report given to ICU nurse Soila CAMERON at bedside. [employer].  [facility]  12/31/20 4147

## 2021-01-01 NOTE — ED PROVIDER NOTES
[facility] ED  eMERGENCY dEPARTMENT eNCOUnter      Pt Name: Sybil Silvestre  MRN: [id number]  [place] 1985  Date of evaluation: 12/31/2020  Provider: Ventura Oglesby MD    CHIEF COMPLAINT       Chief Complaint   Patient presents with    Other         HISTORY OF PRESENT ILLNESS   (Location/Symptom, Timing/Onset,Context/Setting, Quality, Duration, Modifying Factors, Severity)  Note limiting factors. Sybil Silvestre is a 28 y.o. female who presents to the emergency department with complaints of unresponsive episode. Patient was found in a car unresponsive. EMS states her blood sugar was nearly 300. They are concerned that the patient could be in DKA. Patient is hypotensive upon arrival.  Patient is given 2 mg of IV Narcan without effect. Patient slowly came to and was groggy but able to slowly start answering questions. After several minutes she was able to state her name. A few more minutes later she was able to state that she was at [facility].  Patient initially refused to answer further questions but later admitted to the RN that she was drinking liquor all day driving around [facility] [employer]. The patient's mother had come to the ER and stated that she was worried that her daughter has been abusing drugs and that she had been using crack all day long today. The patient was hypotensive and hypoxic. Carboxyhemoglobin checked in the ER registered at 16. The patient admits that she is a smoker. The ER physician explained to the patient is a little too high and that they would have to give her a nonrebreather mask oxygen to get rid of the carbon monoxide. The patient became uncooperative and did not understand what was wrong and why the mask was necessary. A trial of BiPAP failed with the patient ripped the mask off of her. Once again the doctor and nurse discussed with the patient why she needed the therapy but the patient just seemed not to understand what was wrong. The patient's mother stated that she has a son who is dying and she needs her daughter to be taking care of. States if is necessary to put a tube down Stater do whenever we need do so that her daughter can live. The patient's mother is present and contributes to history. The history is provided by the patient, a parent and the EMS personnel. NursingNotes were reviewed. REVIEW OF SYSTEMS    (2-9 systems for level 4, 10 or more for level 5)     Review of Systems   Unable to perform ROS: Mental status change   Constitutional: Positive for fatigue. Skin: Negative for pallor and rash. Hematological: Does not bruise/bleed easily. Psychiatric/Behavioral: Positive for confusion. All other systems reviewed and are negative. Except as noted above the remainder of the review of systems was reviewed and negative. PAST MEDICAL HISTORY   No past medical history on file. SURGICALHISTORY     No past surgical history on file. CURRENT MEDICATIONS       Previous Medications    ETODOLAC (LODINE) 400 MG TABLET    Take 1 tablet by mouth 2 times daily       ALLERGIES     Patient has no known allergies. FAMILY HISTORY     No family history on file.        SOCIAL HISTORY       Social History     Socioeconomic History    Marital status: Single     Spouse name: Not on file    Number of children: Not on file    Years of education: Not on file    Highest education level: Not on file   Occupational History    Not on file   Social Needs    Financial resource strain: Not on file    Food insecurity     Worry: Not on file     Inability: Not on file    Transportation needs     Medical: Not on file     Non-medical: Not on file   Tobacco Use    Smoking status: Current Every Day Smoker    Smokeless tobacco: Never Used   Substance and Sexual Activity    Alcohol use: Not on file    Drug use: Not on file    Sexual activity: Not on file   Lifestyle    Physical activity Days per week: Not on file     Minutes per session: Not on file    Stress: Not on file   Relationships    Social connections     Talks on phone: Not on file     Gets together: Not on file     Attends Sabianism service: Not on file     Active member of club or organization: Not on file     Attends meetings of clubs or organizations: Not on file     Relationship status: Not on file    Intimate partner violence     Fear of current or ex partner: Not on file     Emotionally abused: Not on file     Physically abused: Not on file     Forced sexual activity: Not on file   Other Topics Concern    Not on file   Social History Narrative    Not on file       SCREENINGS      @[id number])@      PHYSICAL EXAM    (up to 7 for level 4, 8 or more for level 5)     ED Triage Vitals   BP Temp Temp src Pulse Resp SpO2 Height Weight   12/31/20 1733 -- -- 12/31/20 1733 12/31/20 1733 12/31/20 1733 12/31/20 1822 12/31/20 1822   (!) 87/50   97 22 100 % 5' 7\" (1.702 m) 250 lb (113.4 kg)       Physical Exam  Vitals signs and nursing note reviewed. Constitutional:       General: She is awake. She is in acute distress. Appearance: Normal appearance. She is well-developed. She is morbidly obese. She is not ill-appearing, toxic-appearing or diaphoretic. Comments: Initially was lethargic however she gradually awakened. She is slow in answering questions. She struggles with answering questions correctly. EMS gave IV Narcan prior to arrival.   HENT:      Head: Normocephalic and atraumatic. No raccoon eyes, Savage's sign, abrasion, contusion or masses. Right Ear: External ear normal.      Left Ear: External ear normal.      Nose: Nose normal. No congestion or rhinorrhea. Mouth/Throat:      Mouth: Mucous membranes are moist.      Pharynx: Oropharynx is clear. No oropharyngeal exudate or posterior oropharyngeal erythema. Eyes:      General: No scleral icterus. Right eye: No foreign body or discharge. Left eye: No discharge. Extraocular Movements: Extraocular movements intact. Conjunctiva/sclera: Conjunctivae normal.      Left eye: No exudate. Pupils: Pupils are equal, round, and reactive to light. Neck:      Musculoskeletal: Normal range of motion and neck supple. No neck rigidity. Vascular: No JVD. Trachea: No tracheal deviation. Comments: No meningismus. Cardiovascular:      Rate and Rhythm: Regular rhythm. Tachycardia present. Pulses: Normal pulses. Heart sounds: Normal heart sounds. Heart sounds not distant. No murmur. No friction rub. No gallop. Pulmonary:      Effort: Pulmonary effort is normal. No respiratory distress. Breath sounds: Normal breath sounds. No stridor. No wheezing, rhonchi or rales. Chest:      Chest wall: No tenderness. Abdominal:      General: Abdomen is flat. Bowel sounds are normal. There is no distension. Palpations: Abdomen is soft. There is no mass. Tenderness: There is no abdominal tenderness. There is no right CVA tenderness, left CVA tenderness, guarding or rebound. Hernia: No hernia is present. Musculoskeletal: Normal range of motion. General: No swelling, tenderness, deformity or signs of injury. Lymphadenopathy:      Head:      Right side of head: No submental adenopathy. Left side of head: No submental adenopathy. Skin:     General: Skin is warm and dry. Capillary Refill: Capillary refill takes less than 2 seconds. Coloration: Skin is not jaundiced or pale. Findings: No bruising, erythema, lesion or rash. Neurological:      General: No focal deficit present. Mental Status: She is alert. Mental status is at baseline. She is disoriented and confused. GCS: GCS eye subscore is 3. GCS verbal subscore is 5. GCS motor subscore is 4. Cranial Nerves: Cranial nerves are intact. No cranial nerve deficit. Sensory: Sensation is intact. No sensory deficit. Motor: Motor function is intact. No weakness or tremor. Coordination: Coordination normal.      Deep Tendon Reflexes: Reflexes are normal and symmetric. Comments: Patient is too weak to assess for gait. She moves all 4 extremities. Patient is able to answer person and place. She is disoriented to time and situation. Psychiatric:         Mood and Affect: Mood normal.         Behavior: Behavior normal. Behavior is cooperative. Thought Content: Thought content normal.         Judgment: Judgment normal.         DIAGNOSTIC RESULTS     EKG: All EKG's are interpreted by the Emergency Department Physician who either signs or Co-signsthis chart in the absence of a cardiologist.    EKG: Sinus rhythm at 100 bpm.  Normal axis. LVH voltage. RSR pattern in V1 and V2 consistent with an incomplete right bundle branch block. The QT interval is 386 ms. The QTC is 497 ms. There are no PVCs.     RADIOLOGY:   Non-plain filmimages such as CT, Ultrasound and MRI are read by the radiologist. Plain radiographic images are visualized and preliminarily interpreted by the emergency physician with the below findings:      Interpretation per the Radiologist below, if available at the time ofthis note:    XR CHEST PORTABLE    (Results Pending)   CTA CHEST W WO CONTRAST    (Results Pending)         ED BEDSIDE ULTRASOUND:   Performed by ED Physician - none    LABS:  Labs Reviewed   URINE DRUG SCREEN - Abnormal; Notable for the following components:       Result Value    Cocaine Metabolite Screen, Urine POSITIVE (*)     PCP Screen, Urine POSITIVE (*)     All other components within normal limits   CBC WITH AUTO DIFFERENTIAL - Abnormal; Notable for the following components:    WBC 11.9 (*)     MCHC 31.3 (*)     RDW 14.6 (*)     Neutrophils Absolute 6.9 (*)     All other components within normal limits   COMPREHENSIVE METABOLIC PANEL - Abnormal; Notable for the following components:    CO2 17 (*)     Anion Gap 21 (*) Glucose 364 (*)     CREATININE 1.26 (*)     GFR Non- 48.2 (*)     GFR  58.3 (*)     All other components within normal limits   CK - Abnormal; Notable for the following components: Total  (*)     All other components within normal limits   OCCULT BLOOD GASTRIC / DUODENUM - Abnormal; Notable for the following components:    Occult Blood, Other POSITIVE (*)     All other components within normal limits   URINE RT REFLEX TO CULTURE - Abnormal; Notable for the following components:    Glucose, Ur 500 (*)     All other components within normal limits   CARBOXYHEMOGLOBIN - Abnormal; Notable for the following components:    Carboxyhemoglobin 6.9 (*)     All other components within normal limits   POCT ARTERIAL - Abnormal; Notable for the following components:    POC Glucose 298 (*)     POC Creatinine 1.2 (*)     GFR Non-African American 51 (*)     Calcium, Ion 1.08 (*)     pH, Arterial 7.199 (*)     pO2, Arterial 72 (*)     HCO3, Arterial 15.7 (*)     Base Excess, Arterial -12 (*)     O2 Sat, Arterial 90 (*)     TCO2, Arterial 17 (*)     Lactate 8.27 (*)     All other components within normal limits   POCT ARTERIAL - Abnormal; Notable for the following components:    pH, Arterial 7.087 (*)     pCO2, Arterial 71 (*)     pO2, Arterial 166 (*)     Base Excess, Arterial -8 (*)     O2 Sat, Arterial 99 (*)     Lactate 4.37 (*)     All other components within normal limits   POC PREGNANCY UR-QUAL - Normal   PROTIME-INR   APTT   ETHANOL   MAGNESIUM   BRAIN NATRIURETIC PEPTIDE   PROCALCITONIN   TROPONIN   PROLACTIN   COVID-19   SPECIMEN REJECTION   CKMB & RELATIVE PERCENT   BETA-HYDROXYBUTYRATE       All other labs were within normal range or not returned as of this dictation.     EMERGENCY DEPARTMENT COURSE and DIFFERENTIAL DIAGNOSIS/MDM:   Vitals:    Vitals:    12/31/20 1915 12/31/20 1920 12/31/20 1930 12/31/20 2107   BP: 109/81 110/64 118/67    Pulse: 94 93 91    Resp: SpO2: 96% 98% 98% 98%   Weight:       Height:           Dr. Rayshawn Alcocer assumes care of the patient at 1. Dr. Emmanuel Garcia has given report about the patient. MDM  Cooximeter reading was 17. Patient is a smoker. A trial of a nonrebreather mask failed. Patient was then tried on BiPAP and failed. Patient is uncooperative. Patient does not understand the gravity of her situation. The ER physician and very simple terms tried explained to the patient that her blood is becoming acetic that her blood pressure is low and they have to regulate her breathing. The patient was emergently intubated. Patient is hyperventilated on the vent. An amp of bicarb is been ordered for the patient. Nurse reports the patient has what appears to be coffee-ground emesis. IV Protonix was ordered and a Gastroccult was ordered. CRITICAL CARE TIME   Total Critical Care time was 77 minutes, excluding separately reportableprocedures. There was a high probability of clinicallysignificant/life threatening deterioration in the patient's condition which required my urgent intervention. CONSULTS:  None    PROCEDURES:  Unless otherwise noted below, none     Intubation    Date/Time: 12/31/2020 6:43 PM  Performed by: Carrillo Kong DO  Authorized by: Carrillo Kong DO     Consent:     Consent obtained:  Verbal    Consent given by:  Parent and patient    Risks discussed:  Bleeding, aspiration, brain injury and hypoxia    Alternatives discussed:  No treatment  Pre-procedure details:     Patient status:  Altered mental status    Mallampati score:  III    Pretreatment meds: Ketamine. Paralytics:  Rocuronium  Procedure details:     Preoxygenation:  Bag valve mask    CPR in progress: no      Laryngoscope blade:   Mac 4    Tube size (mm):  7.5    Tube type:  Cuffed    Number of attempts:  1    Cricoid pressure: yes      Tube visualized through cords: yes    Placement assessment:     ETT to lip:  23 Tube secured with: Adhesive tape and ETT gallego    Breath sounds:  Equal and absent over the epigastrium    Placement verification: condensation and CXR verification      CXR findings:  ETT in proper place  Post-procedure details:     Patient tolerance of procedure: Tolerated well, no immediate complications        FINAL IMPRESSION      1. Acidemia    2. Carboxyhemoglobinemia, undetermined intent, initial encounter    3. Confusion    4. Hyperglycemia    5. Alcohol abuse    6. Morbid obesity due to excess calories ([address]    7. Hypotension, unspecified hypotension type    8. Acute upper gastrointestinal bleeding    9. Aspiration pneumonia of right upper lobe, unspecified aspiration pneumonia type ([address]          DISPOSITION/PLAN   DISPOSITION        PATIENT REFERRED TO:  No follow-up provider specified. DISCHARGE MEDICATIONS:  New Prescriptions    No medications on file          (Please note that portions of this note were completed with a voice recognition program.  Efforts were made to edit the dictations but occasionally words are mis-transcribed.)    Cleveland Rocha MD (electronically signed)  Attending Emergency Physician    Patient has evidence of aspiration pneumonia. Polysubstance abuse. She is admitted to the ICU after respiratory failure secondary to her aspiration pneumonia and polysubstance usage.      Cleveland Rocha MD  12/31/20 2128

## 2021-01-01 NOTE — H&P
Department of Internal Medicine  General Internal Medicine  Attending History and Physical      CHIEF COMPLAINT:  AMS x 1d    Reason for Admission:  resp fail hypercapnia    History Obtained From:  electronic medical record    HISTORY OF PRESENT ILLNESS:      The patient is a 28 y.o. female with unknown past medical history who presents with above cc. All hx is per chart review and d/w ED staff. Pt is intubated/sedated when I see her. No fm is present at time of my exam.  Pt has known polysubstance abuse, including etoh and crack cocaine per pt's mother. Mother reported to ED staff that pat had been using ETOH all day and likely was using other substances. Pt's brother is critically ill and this may have caused pts bingeing episode. Past Medical History:    No past medical history on file. Past Surgical History:    No past surgical history on file. Medications Prior to Admission:    Medications Prior to Admission: etodolac (LODINE) 400 MG tablet, Take 1 tablet by mouth 2 times daily    Allergies:  Patient has no known allergies. Social History:   TOBACCO:   reports that she has been smoking. She has never used smokeless tobacco.  ETOH:  See hpi  DRUGS:  See hpi    Family History:   No family history on file.   REVIEW OF SYSTEMS:  Review of systems not obtained due to patient factors - mental status  PHYSICAL EXAM:    Vitals:  /88   Pulse 79   Temp 98.8 °F (37.1 °C) (Oral)   Resp 21   Ht 5' 7\" (1.702 m)   Wt 253 lb 8.5 oz (115 kg)   SpO2 96%   BMI 39.71 kg/m²     CONSTITUTIONAL:  Intubated sedated  EYES:  Perrla, anicteric sclera  ENT:  normocepalic, without obvious abnormality, atraumatic, nares patent  LUNGS:  clear to auscultation bilaterally, no crackles or wheezing  CARDIOVASCULAR:  Normal apical impulse, regular rate and rhythm, normal S1 and S2, no S3 or S4, and no murmur noted ABDOMEN:  No scars, normal bowel sounds, soft, non-distended, non-tender, no masses palpated, no hepatosplenomegally  MUSCULOSKELETAL:  there is no redness, warmth, or swelling of the joints  NEUROLOGIC:  Unable to examine d/t sedation  SKIN:  Cool, dry, intact    DATA:  CBC:   Lab Results   Component Value Date    WBC 11.9 12/31/2020    RBC 4.21 12/31/2020    HGB 13.1 12/31/2020    HCT 39.0 12/31/2020    MCV 92.7 12/31/2020    MCH 29.0 12/31/2020    MCHC 31.3 12/31/2020    RDW 14.6 12/31/2020     12/31/2020     CMP:    Lab Results   Component Value Date     12/31/2020    K 3.8 12/31/2020    CL 97 12/31/2020    CO2 17 12/31/2020    BUN 8 12/31/2020    CREATININE 0.8 12/31/2020    CREATININE 1.26 12/31/2020    GFRAA >60 12/31/2020    LABGLOM >60 12/31/2020    GLUCOSE 364 12/31/2020    PROT 7.4 12/31/2020    LABALBU 4.2 12/31/2020    CALCIUM 8.5 12/31/2020    BILITOT <0.2 12/31/2020    ALKPHOS 73 12/31/2020    AST 28 12/31/2020    ALT 19 12/31/2020     Troponin:    Lab Results   Component Value Date    TROPONINI <0.010 12/31/2020     U/A:    Lab Results   Component Value Date    COLORU Yellow 12/31/2020    PROTEINU Negative 12/31/2020    PHUR 5.0 12/31/2020    CLARITYU Clear 12/31/2020    SPECGRAV 1.014 12/31/2020    LEUKOCYTESUR Negative 12/31/2020    UROBILINOGEN 0.2 12/31/2020    BILIRUBINUR Negative 12/31/2020    BLOODU Negative 12/31/2020    GLUCOSEU 500 12/31/2020     ABG:  No results found for: PH, PCO2, PO2, HCO3, BE, THGB, TCO2, O2SAT  Urine Toxicology:  No components found for: IAMMENTA, IBARBIT, IBENZO, ICOCAINE, IMARTHC, IOPIATES, IPHENCYC  ASSESSMENT AND PLAN:      Principal Problem:    Acute respiratory failure with hypercapnia ([address]  Plan: Patient currently intubated. Will consult pulmonology. ABG shows improved pH. Maintain current vent settings.   Active Problems:    Polysubstance abuse ([address] Plan: Urine drug screen and alcohol as noted. Will use propofol and Precedex for sedation. Patient having some issues with agitation on propofol alone. She did receive a dose of Ativan in the ED with improvement in symptoms. Aspiration pneumonia ([address]  Plan: Zosyn IV. Supportive care. GI bleed  Plan: PPI bolus given in ED. Will start on IV Protonix drip. Consult gastroenterology. Hemoglobin currently stable. Hemodynamically stable. Will recheck hemoglobin in the morning. DVT prophylaxis SCDs only due to possible GI bleed. Inpatient status. Anticipate greater than 2 day stay due to above.

## 2021-01-01 NOTE — CONSULTS
PRN Meds:sodium chloride flush, promethazine **OR** ondansetron, polyethylene glycol, acetaminophen **OR** acetaminophen, glucose, dextrose, glucagon (rDNA), dextrose        REVIEW OF SYSTEMS: While on vent ROS: 10 organs review of system is done including general, psychological, ENT, hematological, endocrine, respiratory, cardiovascular, gastrointestinal, musculoskeletal, neurological,  allergy and Immunology is done and is otherwise negative. PHYSICAL EXAM:    Vitals:  BP (!) 87/46   Pulse 85   Temp 99.8 °F (37.7 °C) (Axillary)   Resp 24   Ht 5' 7\" (1.702 m)   Wt 255 lb 4.7 oz (115.8 kg)   SpO2 100%   BMI 39.98 kg/m²     General: alert, on vent, slightly anxious  Head: normocephalic, atraumatic  Eyes:No gross abnormalities. ENT:  MMM no lesions  Neck:  supple and no masses  Chest : clear to auscultation bilaterally- no wheezes, rales or rhonchi, normal air movement, no respiratory distress  Heart[de-identified] Heart sounds are normal.  Regular rate and rhythm without murmur, gallop or rub. ABD:  symmetric, soft, non-tender  Musculoskeletal : no cyanosis, no clubbing and no edema  Neuro:  Grossly normal  Skin: No rashes or nodules noted. Lymph node:  no cervical nodes  Urology: Yes Conrad   Psychiatric: Slightly anxious        Data Review  Recent Labs     12/31/20 1800 12/31/20 1800 01/01/21 0530 01/01/21 0539 01/01/21  1032   WBC 11.9*  --  14.5*  --   --    HGB 12.2   < > 12.2 13.5 12.8   HCT 39.0  --  38.9  --   --      --  302  --   --     < > = values in this interval not displayed. Recent Labs     12/31/20 1800 12/31/20 1800 01/01/21 0530 01/01/21 0539 01/01/21  1032     --  138  --   --    K 3.8  --  4.4  --   --    CL 97  --  101  --   --    CO2 17*  --  19*  --   --    BUN 8  --  6  --   --    CREATININE 1.26*   < > 0.68 0.8 0.9   GLUCOSE 364*  --  70  --   --     < > = values in this interval not displayed.        MV Settings:     Vent Mode: (S) CPAP  Vt Ordered: 480 mL Rate Set: 24 bmp  PEEP/CPAP: (S) 5  Pressure Support: (S) 5 cmH20  Peak Inspiratory Pressure: 32 cmH2O  Mean Airway Pressure: 7.5 cmH20  I:E Ratio: 1:4.00    ABGs:   Recent Labs     12/31/20  2155 01/01/21  0539 01/01/21  1032   PHART 7.230* 7.436 7.388   GXO9GYX 62* 37 45   PO2ART 276* 167* 72*   JBN1SNK 25.9 24.8 26.9   BEART -2 1 2   [id number] 100* 100* 94*   YRL3QMZ 28 26 28     O2 Device: Ventilator  Lab Results   Component Value Date    LACTA 2.7 01/01/2021       Radiology  I personally reviewed imaging studies and CT chest shows bibasilar infiltrate        Assessment, plan:    This is a critically ill patient       · Acute hypoxic and hypercapnic respiratory failure requiring intubation  · Bilateral basal pneumonia secondary to aspiration  · Altered mental status secondary to substance abuse    Recommendation  · Patient tolerated CPAP trial  · Extubated to nasal cannula  · Will start Zosyn  · Respiratory cultures  · No wheezing on exam will DC Solu-Medrol  · DC COVID-19 isolation test was negative and imaging studies and history does not suggest infection  · O2 to keep sat 90 to 92%  · Watch in ICU post extubation  · Bedside swallow eval, start diet if patient does fine           Thank you for consultation    Electronically signed by Link Srivastava MD, [facility],  on 1/1/2021 at 11:59 AM

## 2021-01-01 NOTE — PROGRESS NOTES
22:50pm Received from Er via cart to CCU 16. Vented and sedated. VSS TRACY_. Anamaria to MIGNON.  Bandar for sedation

## 2021-01-01 NOTE — CONSULTS
Inpatient consult to GI  Consult performed by: Chito Naranjo MD  Consult ordered by: Claudis Apley, DO          Patient Name: Nasrin Fabian  Admit Date: 2020  5:31 PM  MR #: [id number]  : 1985    Attending Physician: Wil Beavers MD  Reason for consult: GIB    History of Presenting Illness:      Nasrin Fabian is a 28 y.o. female on hospital day 1 with no pertinent past medical or surgical history. Family history was reviewed and is negative for GI malignancies. Social history patient has history of nicotine, EtOH and illicit drug use history Obtained From:  electronic medical record    GI consult for GI bleed-patient was admitted via EMS after being found unresponsive in her vehicle was subsequently intubated, after intubation patient had one episode of coffee-ground emesis, she is now extubated and  has had no further emesis, she is hemodynamically stable, hemoglobin is 12.8, was initially initiated on a Protonix infusion however this has been discontinued. Urine tox was positive for cocaine and PCP. No previous history of GI bleeding, plan of care discussed with nursing. History:      No past medical history on file. No past surgical history on file. Family History  No family history on file.   [] Unable to obtain due to ventilated and/ or neurologic status  Social History     Socioeconomic History    Marital status: Single     Spouse name: Not on file    Number of children: Not on file    Years of education: Not on file    Highest education level: Not on file   Occupational History    Not on file   Social Needs    Financial resource strain: Not on file    Food insecurity     Worry: Not on file     Inability: Not on file    Transportation needs     Medical: Not on file     Non-medical: Not on file   Tobacco Use    Smoking status: Current Every Day Smoker    Smokeless tobacco: Never Used   Substance and Sexual Activity    Alcohol use: Not on file    Drug use: Not on file  Sexual activity: Not on file   Lifestyle    Physical activity     Days per week: Not on file     Minutes per session: Not on file    Stress: Not on file   Relationships    Social connections     Talks on phone: Not on file     Gets together: Not on file     Attends Jew service: Not on file     Active member of club or organization: Not on file     Attends meetings of clubs or organizations: Not on file     Relationship status: Not on file    Intimate partner violence     Fear of current or ex partner: Not on file     Emotionally abused: Not on file     Physically abused: Not on file     Forced sexual activity: Not on file   Other Topics Concern    Not on file   Social History Narrative    Not on file      [] Unable to obtain due to ventilated and/ or neurologic status    Home Medications:      Medications Prior to Admission: etodolac (LODINE) 400 MG tablet, Take 1 tablet by mouth 2 times daily    Current Hospital Medications:   Scheduled Meds:   sodium chloride flush  10 mL Intravenous 2 times per day    piperacillin-tazobactam  3.375 g Intravenous Q8H    insulin lispro  0-6 Units Subcutaneous Q4H    albuterol sulfate HFA  4 puff Inhalation Q6H    ipratropium  4 puff Inhalation Q6H    flumazenil  0.5 mg Intravenous Once    naloxone  0.4 mg Intravenous Once     Continuous Infusions:   dextrose       PRN Meds:.sodium chloride flush, promethazine **OR** ondansetron, polyethylene glycol, acetaminophen **OR** acetaminophen, glucose, dextrose, glucagon (rDNA), dextrose   dextrose        Allergies:   No Known Allergies   Review of Systems:       [x] CV, Resp, Neuro, , and all other systems reviewed and negative other than listed in HPI.         Objective Findings:     Vitals:   Vitals:    01/01/21 0645 01/01/21 0700 01/01/21 0746 01/01/21 0800   BP:  (!) 96/53  (!) 87/46   Pulse: 89 87 85 85   Resp: 24 24 24 24   Temp:    99.8 °F (37.7 °C)   TempSrc:    Axillary   SpO2: 100% 100% 100% 100% Weight:       Height:            Physical Examination:  General: alert  HEENT: Normocephalic, no scleral icterus. Neck: No JVD. Heart: Regular, no murmur, no rub/gallop. No RV heave. Lungs: Clear to ascultation, no rales/wheezing/rhonchi. Good chest wall excursion. Abdomen: Appearance:, no Distension, Soft, no tenderness, Bowel sounds normal  Extremities: No clubbing/cyanosis, no edema. Skin: Warm, dry, normal turgor, no rash, no bruise, no petichiae. Neuro: No myoclonus or tremor. Psych: Normal affect    Results/ Medications reviewed 1/1/2021, 1:39 PM     Laboratory, Microbiology, Pathology, Radiology, Cardiology, Medications and Transcriptions reviewed  Scheduled Meds:   sodium chloride flush  10 mL Intravenous 2 times per day    piperacillin-tazobactam  3.375 g Intravenous Q8H    insulin lispro  0-6 Units Subcutaneous Q4H    albuterol sulfate HFA  4 puff Inhalation Q6H    ipratropium  4 puff Inhalation Q6H    flumazenil  0.5 mg Intravenous Once    naloxone  0.4 mg Intravenous Once     Continuous Infusions:   dextrose         Recent Labs     12/31/20 1800 12/31/20 1800 01/01/21  0530 01/01/21  0539 01/01/21  1032   WBC 11.9*  --  14.5*  --   --    HGB 12.2   < > 12.2 13.5 12.8   HCT 39.0  --  38.9  --   --    MCV 92.7  --  91.9  --   --      --  302  --   --     < > = values in this interval not displayed. Recent Labs     12/31/20 1800 12/31/20 1800 01/01/21  0530 01/01/21  0539 01/01/21  1032     --  138  --   --    K 3.8  --  4.4  --   --    CL 97  --  101  --   --    CO2 17*  --  19*  --   --    BUN 8  --  6  --   --    CREATININE 1.26*   < > 0.68 0.8 0.9    < > = values in this interval not displayed. Recent Labs     12/31/20 1800   AST 28   ALT 19   BILITOT <0.2   ALKPHOS 73     No results for input(s): LIPASE, AMYLASE in the last 72 hours.   Recent Labs     12/31/20  1800   PROT 7.4   INR 1.1     Cta Chest W Wo Contrast    Result Date: 1/1/2021 The EXAMINATION: CT scan of the chest with contrast (pulmonary embolism protocol) INDICATION: Ventilated patient. Hypoxia, tachycardia COMPARISON: None TECHNIQUE: Helical CT was performed through the chest utilizing 100 cc of Isovue-370 intravenous contrast.  Images were obtained with bolus tracking in order to opacify the pulmonary arteries. Both MIP and 3D volume rendered reconstructions were performed. FINDINGS: This is a suboptimal examination due to limited opacification. Within limits of the examination examination there are no gross central or gross proximal pulmonary emboli. There is areas of patchy to coalescent airspace disease, in the right upper lobe and left lower lobe with more dense infiltrate/consolidation seen right and left lower lobes. Air bronchograms are noted in both bases. No pleural effusions. No pneumothoraces. No significant mediastinal adenopathy. Incidental note is made of an aberrant right subclavian artery. This is a normal variant. In the field-of-view of the abdomen visualized abdominal contents are unremarkable. Osseous structures are intact. There is some debris layering dependently in the upper third of the esophagus which may represent a component of reflux. Correlate clinically     1. THIS IS A SUBOPTIMAL EXAMINATION DUE TO LIMITED OPACIFICATION. WITHIN LIMITS OF THE EXAMINATION EXAMINATION THERE ARE NO GROSS CENTRAL OR GROSS PROXIMAL PULMONARY EMBOLI. 2.  AREAS OF PATCHY TO COALESCENT AIRSPACE DISEASE IN THE RIGHT UPPER AND LEFT LOWER LOBE WITH MORE DENSE, INFILTRATE/CONSOLIDATION SEEN WITHIN RIGHT LOWER LOBE AND LEFT LOWER LOBE. Vonzella Goodpasture All CT scans at this facility use dose modulation, iterative reconstruction, and/or weight based dosing when appropriate to reduce radiation dose to as low as reasonably achievable.      Xr Chest Portable    Result Date: 1/1/2021 3.  No immediate need for endoscopic investigation given the resolution of symptoms  4. Oral PPI daily  5. Okay to advance diet as tolerated from a GI perspective  Comments: Thank you for allowing us to participate in the care of this patient. Will follow. Please call if questions or concerns arise. A/P  Agree regarding assessment and plan. Patient admitted owing to change mental status, was intubated as found unresponsive currently extubated. .  Reported history of coffee-ground emesis however hemoglobin stable with no further episodes since admission. No immediate indication for EGD at this time. Continue PPI, advance diet as tolerated. Enrique Abbott MD  Please note this report has been partially produced using speech recognition software and may cause contain errors related to that system including grammar, punctuation and spelling as well as words and phrases that may seem inappropriate. If there are questions or concerns please feel free to contact me to clarify.

## 2021-01-01 NOTE — PROGRESS NOTES
Report received, patient assessed. VSS, moves all extremities, follows commands. Sedation weaned off, patient extubated at 26 to 3 L NC. Restraints DC'd.

## 2021-01-01 NOTE — PROGRESS NOTES
Progress Note  Date:2021       Room:[place] [address]  Patient Collette Camp     YOB: 1985     Age:35 y.o.    ROS: could not be obtained bc pt is intubated    Subjective    Subjective   Review of Systems  Objective         Vitals Last 24 Hours:  TEMPERATURE:  Temp  Av.6 °F (37.6 °C)  Min: 97.7 °F (36.5 °C)  Max: 101.9 °F (38.8 °C)  RESPIRATIONS RANGE: Resp  Av.2  Min: 21  Max: 31  PULSE OXIMETRY RANGE: SpO2  Av.5 %  Min: 74 %  Max: 100 %  PULSE RANGE: Pulse  Av.5  Min: 57  Max: 128  BLOOD PRESSURE RANGE: Systolic (30OIC), CRM:652 , Min:87 , UQQ:984   ; Diastolic (11KEK), YBC:75, Min:46, Max:112    I/O (24Hr): Intake/Output Summary (Last 24 hours) at 2021 1147  Last data filed at 2021 0648  Gross per 24 hour   Intake 961 ml   Output 2450 ml   Net -1489 ml     Objective:  General Appearance: In no acute distress. Vital signs: (most recent): Blood pressure (!) 87/46, pulse 85, temperature 99.8 °F (37.7 °C), temperature source Axillary, resp. rate 24, height 5' 7\" (1.702 m), weight 255 lb 4.7 oz (115.8 kg), SpO2 100 %. HEENT: (+ ET tube)    Lungs:  Normal effort and normal respiratory rate. Breath sounds clear to auscultation. She is not in respiratory distress. No stridor. No decreased breath sounds. Heart: Normal rate. Regular rhythm. S1 normal and S2 normal.    Abdomen: Abdomen is soft. There is no epigastric area or suprapubic area tenderness. Pulses: Distal pulses are intact. Pupils:  Pupils are equal, round, and reactive to light. Skin:  Warm and dry.       Labs/Imaging/Diagnostics    Labs:  CBC:  Recent Labs     20  1800 20  1800 21  0530 21  0539 21  1032   WBC 11.9*  --  14.5*  --   --    RBC 4.21  --  4.23  --   --    HGB 12.2   < > 12.2 13.5 12.8   HCT 39.0  --  38.9  --   --    MCV 92.7  --  91.9  --   --    RDW 14.6*  --  14.7*  --   --      --  302  --   -- < > = values in this interval not displayed. CHEMISTRIES:  Recent Labs     12/31/20  1800 12/31/20  1800 01/01/21  0530 01/01/21  0539 01/01/21  1032     --  138  --   --    K 3.8  --  4.4  --   --    CL 97  --  101  --   --    CO2 17*  --  19*  --   --    BUN 8  --  6  --   --    CREATININE 1.26*   < > 0.68 0.8 0.9   GLUCOSE 364*  --  70  --   --    MG 2.4  --   --   --   --     < > = values in this interval not displayed.      PT/INR:  Recent Labs     12/31/20  1800   PROTIME 14.6   INR 1.1     APTT:  Recent Labs     12/31/20  1800   APTT 25.4     LIVER PROFILE:  Recent Labs     12/31/20  1800   AST 28   ALT 19   BILITOT <0.2   ALKPHOS 73       Imaging Last 24 Hours:  Cta Chest W Wo Contrast    Result Date: 1/1/2021 The EXAMINATION: CT scan of the chest with contrast (pulmonary embolism protocol) INDICATION: Ventilated patient. Hypoxia, tachycardia COMPARISON: None TECHNIQUE: Helical CT was performed through the chest utilizing 100 cc of Isovue-370 intravenous contrast.  Images were obtained with bolus tracking in order to opacify the pulmonary arteries. Both MIP and 3D volume rendered reconstructions were performed. FINDINGS: This is a suboptimal examination due to limited opacification. Within limits of the examination examination there are no gross central or gross proximal pulmonary emboli. There is areas of patchy to coalescent airspace disease, in the right upper lobe and left lower lobe with more dense infiltrate/consolidation seen right and left lower lobes. Air bronchograms are noted in both bases. No pleural effusions. No pneumothoraces. No significant mediastinal adenopathy. Incidental note is made of an aberrant right subclavian artery. This is a normal variant. In the field-of-view of the abdomen visualized abdominal contents are unremarkable. Osseous structures are intact. There is some debris layering dependently in the upper third of the esophagus which may represent a component of reflux. Correlate clinically     1. THIS IS A SUBOPTIMAL EXAMINATION DUE TO LIMITED OPACIFICATION. WITHIN LIMITS OF THE EXAMINATION EXAMINATION THERE ARE NO GROSS CENTRAL OR GROSS PROXIMAL PULMONARY EMBOLI. 2.  AREAS OF PATCHY TO COALESCENT AIRSPACE DISEASE IN THE RIGHT UPPER AND LEFT LOWER LOBE WITH MORE DENSE, INFILTRATE/CONSOLIDATION SEEN WITHIN RIGHT LOWER LOBE AND LEFT LOWER LOBE. Marilia Metzger All CT scans at this facility use dose modulation, iterative reconstruction, and/or weight based dosing when appropriate to reduce radiation dose to as low as reasonably achievable.      Xr Chest Portable    Result Date: 1/1/2021 EXAMINATION: CHEST PORTABLE VIEW  CLINICAL HISTORY: Respiratory failure. Ventilated patient. COMPARISONS: December 31, 2020  FINDINGS: Single  views of the chest is submitted. There is an endotracheal tube. The tip lies at level clavicles. There is a nasogastric tube. The tip is not seen but does lie below hemidiaphragm. The cardiac silhouette is enlarged Pulmonary vascular unremarkable. Right sided trachea. Bibasilar infiltrates consolidations left greater than right with left pleural effusion. No Pneumothoraces. BIBASILAR INFILTRATES CONSOLIDATIONS LEFT GREATER THAN RIGHT WITH LEFT PLEURAL EFFUSION. Xr Chest Portable    Result Date: 12/31/2020  EXAMINATION: CHEST PORTABLE VIEW  CLINICAL HISTORY: Intubation COMPARISONS: None  FINDINGS: Single  views of the chest is submitted. There is an endotracheal tube. The tip lies level clavicles. There is a nasogastric tube. The tip is not seen but does lie below hemidiaphragm. The cardiac silhouette is of normal size configuration. Pulmonary vascular unremarkable. Right sided trachea. Bibasilar patchy to coalescent infiltrate/consolidation with right lower lobes. Right greater than left. No Pneumothoraces. BIBASILAR PATCHY TO COALESCENT INFILTRATE/CONSOLIDATION BOTH BASES RIGHT GREATER THAN LEFT. Assessment//Plan           Hospital Problems           Last Modified POA    * (Principal) Acute respiratory failure with hypercapnia ([address] 12/31/2020 Yes    Polysubstance abuse ([address] 12/31/2020 Yes    Aspiration pneumonia ([address] 12/31/2020 Yes    GI bleed 12/31/2020 Yes        Assessment & Plan  1) Acute Respiratory failure with hypercapnia  2) polysubstance abuse  3) GI bleed, hgb is stable  4) ?  aspiration PNa Sedation trial,  possible extubation today. Follow-up critical care. Follow-up GI. Continue PPI. Continue IV antibiotics for now.  Repeat labs  Electronically signed by Fermin Castaneda MD on 1/1/21 at 11:47 AM EST

## 2021-01-02 LAB
ALBUMIN SERPL-MCNC: 3.6 G/DL (ref 3.5–4.6)
ALP BLD-CCNC: 63 U/L (ref 40–130)
ALT SERPL-CCNC: 13 U/L (ref 0–33)
ANION GAP SERPL CALCULATED.3IONS-SCNC: 12 MEQ/L (ref 9–15)
AST SERPL-CCNC: 13 U/L (ref 0–35)
BASOPHILS ABSOLUTE: 0.1 K/UL (ref 0–0.2)
BASOPHILS RELATIVE PERCENT: 0.4 %
BILIRUB SERPL-MCNC: 0.3 MG/DL (ref 0.2–0.7)
BUN BLDV-MCNC: 10 MG/DL (ref 6–20)
CALCIUM SERPL-MCNC: 8.5 MG/DL (ref 8.5–9.9)
CHLORIDE BLD-SCNC: 106 MEQ/L (ref 95–107)
CO2: 24 MEQ/L (ref 20–31)
CREAT SERPL-MCNC: 0.86 MG/DL (ref 0.5–0.9)
EOSINOPHILS ABSOLUTE: 0 K/UL (ref 0–0.7)
EOSINOPHILS RELATIVE PERCENT: 0.2 %
GFR AFRICAN AMERICAN: >60
GFR NON-AFRICAN AMERICAN: >60
GLOBULIN: 3 G/DL (ref 2.3–3.5)
GLUCOSE BLD-MCNC: 135 MG/DL (ref 60–115)
GLUCOSE BLD-MCNC: 136 MG/DL (ref 60–115)
GLUCOSE BLD-MCNC: 146 MG/DL (ref 60–115)
GLUCOSE BLD-MCNC: 161 MG/DL (ref 70–99)
GLUCOSE BLD-MCNC: 163 MG/DL (ref 60–115)
HCT VFR BLD CALC: 32.9 % (ref 37–47)
HEMOGLOBIN: 10.7 G/DL (ref 12–16)
LACTIC ACID: 2.3 MMOL/L (ref 0.5–2.2)
LYMPHOCYTES ABSOLUTE: 2.7 K/UL (ref 1–4.8)
LYMPHOCYTES RELATIVE PERCENT: 17.7 %
MCH RBC QN AUTO: 29.2 PG (ref 27–31.3)
MCHC RBC AUTO-ENTMCNC: 32.5 % (ref 33–37)
MCV RBC AUTO: 90 FL (ref 82–100)
MONOCYTES ABSOLUTE: 0.9 K/UL (ref 0.2–0.8)
MONOCYTES RELATIVE PERCENT: 5.7 %
NEUTROPHILS ABSOLUTE: 11.8 K/UL (ref 1.4–6.5)
NEUTROPHILS RELATIVE PERCENT: 76 %
PDW BLD-RTO: 14.1 % (ref 11.5–14.5)
PERFORMED ON: ABNORMAL
PLATELET # BLD: 294 K/UL (ref 130–400)
POTASSIUM SERPL-SCNC: 3.7 MEQ/L (ref 3.4–4.9)
RBC # BLD: 3.66 M/UL (ref 4.2–5.4)
SODIUM BLD-SCNC: 142 MEQ/L (ref 135–144)
TOTAL PROTEIN: 6.6 G/DL (ref 6.3–8)
WBC # BLD: 15.5 K/UL (ref 4.8–10.8)

## 2021-01-02 PROCEDURE — 6370000000 HC RX 637 (ALT 250 FOR IP): Performed by: INTERNAL MEDICINE

## 2021-01-02 PROCEDURE — 2580000003 HC RX 258: Performed by: INTERNAL MEDICINE

## 2021-01-02 PROCEDURE — 1210000000 HC MED SURG R&B

## 2021-01-02 PROCEDURE — 80053 COMPREHEN METABOLIC PANEL: CPT

## 2021-01-02 PROCEDURE — 99233 SBSQ HOSP IP/OBS HIGH 50: CPT | Performed by: INTERNAL MEDICINE

## 2021-01-02 PROCEDURE — 36415 COLL VENOUS BLD VENIPUNCTURE: CPT

## 2021-01-02 PROCEDURE — 85025 COMPLETE CBC W/AUTO DIFF WBC: CPT

## 2021-01-02 PROCEDURE — 6370000000 HC RX 637 (ALT 250 FOR IP): Performed by: NURSE PRACTITIONER

## 2021-01-02 PROCEDURE — 6360000002 HC RX W HCPCS: Performed by: INTERNAL MEDICINE

## 2021-01-02 PROCEDURE — 83605 ASSAY OF LACTIC ACID: CPT

## 2021-01-02 PROCEDURE — 99231 SBSQ HOSP IP/OBS SF/LOW 25: CPT | Performed by: NURSE PRACTITIONER

## 2021-01-02 PROCEDURE — 94664 DEMO&/EVAL PT USE INHALER: CPT

## 2021-01-02 RX ORDER — AMOXICILLIN AND CLAVULANATE POTASSIUM 875; 125 MG/1; MG/1
1 TABLET, FILM COATED ORAL 2 TIMES DAILY
Qty: 14 TABLET | Refills: 0 | Status: SHIPPED | OUTPATIENT
Start: 2021-01-02 | End: 2021-01-09

## 2021-01-02 RX ORDER — ALBUTEROL SULFATE 90 UG/1
2 AEROSOL, METERED RESPIRATORY (INHALATION) EVERY 4 HOURS PRN
Status: DISCONTINUED | OUTPATIENT
Start: 2021-01-02 | End: 2021-01-03 | Stop reason: HOSPADM

## 2021-01-02 RX ORDER — PANTOPRAZOLE SODIUM 40 MG/1
40 TABLET, DELAYED RELEASE ORAL
Qty: 30 TABLET | Refills: 3 | Status: SHIPPED | OUTPATIENT
Start: 2021-01-03

## 2021-01-02 RX ADMIN — PANTOPRAZOLE SODIUM 40 MG: 40 TABLET, DELAYED RELEASE ORAL at 06:06

## 2021-01-02 RX ADMIN — PIPERACILLIN AND TAZOBACTAM 3.38 G: 3; .375 INJECTION, POWDER, LYOPHILIZED, FOR SOLUTION INTRAVENOUS at 15:20

## 2021-01-02 RX ADMIN — PIPERACILLIN AND TAZOBACTAM 3.38 G: 3; .375 INJECTION, POWDER, LYOPHILIZED, FOR SOLUTION INTRAVENOUS at 06:06

## 2021-01-02 RX ADMIN — BENZOCAINE AND MENTHOL, UNSPECIFIED FORM 1 LOZENGE: 15; 3.6 LOZENGE ORAL at 15:40

## 2021-01-02 RX ADMIN — Medication 10 ML: at 10:13

## 2021-01-02 RX ADMIN — BENZOCAINE AND MENTHOL, UNSPECIFIED FORM 1 LOZENGE: 15; 3.6 LOZENGE ORAL at 02:49

## 2021-01-02 RX ADMIN — INSULIN LISPRO 2 UNITS: 100 INJECTION, SOLUTION INTRAVENOUS; SUBCUTANEOUS at 16:40

## 2021-01-02 RX ADMIN — PIPERACILLIN AND TAZOBACTAM 3.38 G: 3; .375 INJECTION, POWDER, LYOPHILIZED, FOR SOLUTION INTRAVENOUS at 22:05

## 2021-01-02 RX ADMIN — Medication 10 ML: at 22:05

## 2021-01-02 RX ADMIN — INSULIN LISPRO 2 UNITS: 100 INJECTION, SOLUTION INTRAVENOUS; SUBCUTANEOUS at 12:25

## 2021-01-02 ASSESSMENT — PAIN SCALES - GENERAL: PAINLEVEL_OUTOF10: 0

## 2021-01-02 NOTE — PROGRESS NOTES
Pt arrived via wheelchair from ICU. Pt walked to the bed from the chair, pt states she feels a bit unsteady, educated patient on fall risk and need for bed alarm. Pt's mother called and expressed she would like all family members to call her and her  for updates. Admission documentation completed. Medication list updated. Assessment complete; pt alert and oriented X4, calm and cooperative. Lung sounds clear, moist cough noted, heart sounds normal, bowel sounds active in all four quadrants. Abdomen is soft and non tender. Pt denies chest pain, SOB, headache, nausea or vomiting. VSS. Spoke with patient about her mother, Pt stated \" you can forward her calls to me and I will speak with her\" , further phone calls from patient's mother where forwarded to the patient. Pt stating her throat is sore, PRN medication given, see EMAR.  Bed alarm on, call light in reach, will continue to monitor Electronically signed by Pablito Mendez RN on 1/2/2021 at 3:09 AM

## 2021-01-02 NOTE — FLOWSHEET NOTE
Pt is A&OX4. PERRLA. Lungs are clear bilaterally. Bowel sounds are active. Pulses palpable. Dr. Nitin Tompkins rounded and stated that he would like to d/c the pt today.  Perfect serve sent to Dr. Severino Alfonso

## 2021-01-02 NOTE — DISCHARGE SUMMARY
Discharge Summary    Date: 1/2/2021  Patient Name: Liliana Solorzano YOB: 1985 Age: 28 y.o. Admit Date: 12/31/2020  Discharge Date: 1/2/2020  Discharge Condition: Stable    Admission Diagnosis  Acute respiratory failure with hypoxia and hypercarbia (HCC) (J96.01, J96.02)     Discharge Diagnosis  Principal Problem: Acute respiratory failure with hypercapnia (HCC)Active Problems: Polysubstance abuse ([address] Aspiration pneumonia (HCC) GI bleedResolved Problems: * No resolved hospital problems. [facility] Stay  Narrative of Hospital Course:  Patient comes acute respiratory failure secondary aspiration pneumonia due to overdose on polysubstance abuse. Patient extubated. Started on Zosyn for aspiration pneumonia. Was evaluated pulmonary. Patient is on room air. No hypoxia noted    Consultants:  IP CONSULT TO CRITICAL CAREIP CONSULT TO GI    Surgeries/procedures Performed:       Treatments:    Antibiotics and IV Hydration    Zosyn    Discharge Plan/Disposition:  Home    Hospital/Incidental Findings Requiring Follow Up:    Patient Instructions:    Diet: Regular Diet    Activity:Activity as Tolerated  For number of days (if applicable): Other Instructions:    Provider Follow-Up:   No follow-ups on file. Significant Diagnostic Studies:    Recent Labs:  Admission on 12/31/2020No results displayed because visit has over 200 results. ------------    Radiology last 7 days: Cta Chest W Wo ContrastResult Date: 1/1/20211. THIS IS A SUBOPTIMAL EXAMINATION DUE TO LIMITED OPACIFICATION. WITHIN LIMITS OF THE EXAMINATION EXAMINATION THERE ARE NO GROSS CENTRAL OR GROSS PROXIMAL PULMONARY EMBOLI. 2.  AREAS OF PATCHY TO COALESCENT AIRSPACE DISEASE IN THE RIGHT UPPER AND LEFT LOWER LOBE WITH MORE DENSE, INFILTRATE/CONSOLIDATION SEEN WITHIN RIGHT LOWER LOBE AND LEFT LOWER LOBE. Ulus Reusing All CT scans at this facility use dose modulation, iterative reconstruction, and/or weight based dosing when appropriate to reduce radiation dose to as low as reasonably achievable. Xr Chest PortableResult Date: 1/1/2021BIBASILAR INFILTRATES CONSOLIDATIONS LEFT GREATER THAN RIGHT WITH LEFT PLEURAL EFFUSION. Xr Chest PortableResult Date: 12/31/2020BIBASILAR PATCHY TO COALESCENT INFILTRATE/CONSOLIDATION BOTH BASES RIGHT GREATER THAN LEFT. Pending Labs   Order Current Status  Culture, Respiratory Preliminary result  SPECIMEN REJECTION Preliminary result      Discharge Medications    Current Discharge Medication ListSTART taking these medicationspantoprazole (PROTONIX) 40 MG tabletTake 1 tablet by mouth every morning (before breakfast)Qty: 30 tablet Refills: 3amoxicillin-clavulanate (AUGMENTIN) 875-125 MG per tabletTake 1 tablet by mouth 2 times daily for 7 daysQty: 14 tablet Refills: 0metFORMIN (GLUCOPHAGE) 500 MG tabletTake 1 tablet by mouth 2 times daily (with meals)Qty: 60 tablet Refills: 0    Current Discharge Medication List    Current Discharge Medication List    Current Discharge Medication ListSTOP taking these medicationsetodolac (LODINE) 400 MG tabletComments:Reason for Stopping:    Time Spent on Discharge:E] minutes were spent in patient examination, evaluation, counseling as well as medication reconciliation, prescriptions for required medications, discharge plan, and follow up.     Electronically signed by Annie Doyle MD on 1/2/21 at 11:26 AM EST   overtime on dc summary was 45 min

## 2021-01-02 NOTE — PROGRESS NOTES
27-year-old female admitted unresponsive requiring intubation, with one episode of emesis after intubation. Currently extubated doing well has been tolerating diet no further emesis. Hemodynamically stable. PLAN :  1.  No need for endoscopic investigation, since her symptoms are resolved, will sign off, follow-up as an outpatient with GI. Thank you for allowing me to participate in the care of your patient. Please feel free to contact me with any concerns.     Farhan Carty, NIKHIL - CNP

## 2021-01-02 NOTE — PROGRESS NOTES
[]   Smoking history  ?  20 pack years  [x]   Pulmonary Disorder  (acute or chronic)  []   Severe or Chronic w/ Exacerbation  []     Surgical Status No [x]   Surgeries     General []   Surgery Lower []   Abdominal Thoracic or []   Upper Abdominal Thoracic with  PulmonaryDisorder  []     Chest X-ray Clear/Not  Ordered     []  Chronic Changes  Results Pending  []  Infiltrates, atelectasis, pleural effusion, or edema  [x]  Infiltrates in more than one lobe []  Infiltrate + Atelectasis, &/or pleural effusion  []    Respiratory Pattern Regular,  RR = 12-20 [x]  Increased,  RR = 21-25 []  WOOTEN, irregular,  or RR = 26-30 []  Decreased FEV1  or RR = 31-35 []  Severe SOB, use  of accessory muscles, or RR ? 35  []    Mental Status Alert, oriented,  Cooperative [x]  Confused but Follows commands []  Lethargic or unable to follow commands []  Obtunded  []  Comatose  []    Breath Sounds Clear to  auscultation  []  Decreased unilaterally or  in bases only [x]  Decreased  bilaterally  []  Crackles or intermittent wheezes []  Wheezes []    Cough Strong, Spontan., & nonproductive [x]  Strong,  spontaneous, &  productive []  Weak,  Nonproductive []  Weak, productive or  with wheezes []  No spontaneous  cough or may require suctioning []    Level of Activity Ambulatory [x]  Ambulatory w/ Assist  []  Non-ambulatory []  Paraplegic []  Quadriplegic []    Total    Score:__5___     Triage Score:____5____      Tri       Triage:     1. (>20) Freq: Q3    2. (16-20) Freq: Q4   3. (11-15) Freq: QID & Albuterol Q2 PRN    4. (6-10) Freq: TID & Albuterol Q2 PRN    5. (0-5) Freq Q4prn

## 2021-01-02 NOTE — PROGRESS NOTES
INPATIENT PROGRESS NOTES    PATIENT NAME: Salvador Lema  MRN: [id number]  SERVICE DATE:  January 2, 2021   SERVICE TIME:  10:46 AM      PRIMARY SERVICE: Pulmonary Disease    CHIEF COMPLAIN: Acute respiratory failure, unresponsive      INTERVAL HPI: Patient seen and examined at bedside, Interval Notes, orders reviewed. Nursing notes noted  Patient was transferred out of ICU early morning. She was extubated yesterday. She is sleepy but arousable. She has complaint of cough but mostly dry. No complaint of shortness of breath. She is not having any chest pain. No nausea vomiting diarrhea. She has history of drug abuse. Urine drug screen positive for PCP and cocaine. CT chest shows bibasilar consolidation with airspace infiltration in right upper and left lower lobe. Some debris is layering in upper third of the esophagus represent component of reflux. Patient possibly has aspiration pneumonia currently on IV Zosyn. She has leukocytosis WBC 15.5. She is afebrile. OBJECTIVE    Body mass index is 39.98 kg/m². PHYSICAL EXAM:  Vitals:  /62   Pulse 94   Temp 98.4 °F (36.9 °C) (Oral)   Resp 16   Ht 5' 7\" (1.702 m)   Wt 255 lb 4.7 oz (115.8 kg)   SpO2 97%   BMI 39.98 kg/m²   General: Obese. Sleepy but arousable. .comfortable in bed, No distress. Head: Atraumatic , Normocephalic   Eyes: PERRL. No sclera icterus. No conjunctival injection. No discharge   ENT: No nasal  discharge. Pharynx clear. lips, teeth, mucosa and gums are normal, tongue protrudes in the midline  Neck:  Trachea midline. No thyromegaly, no JVD, No cervical adenopathy. Chest : Bilaterally symmetrical ,Normal effort,  No accessory muscle use  Lung : . Fair BS bilateral, decreased BS at bases. Few basilar Rales. No wheezing. No rhonchi. Heart[de-identified] Normal  rate. Regular rhythm. No mumur ,  Rub or gallop  ABD: Non-tender. Non-distended. No masses. No organmegaly. Normal bowel sounds. No hernia. Ext : No Pitting both leg , No Cyanosis No clubbing  Neuro: no focal weakness          DATA:   Recent Labs     01/01/21 0530 01/01/21 0530 01/01/21 1032 01/02/21 0517   WBC 14.5*  --   --  15.5*   HGB 12.2   < > 12.8 10.7*   HCT 38.9  --   --  32.9*   MCV 91.9  --   --  90.0     --   --  294    < > = values in this interval not displayed. Recent Labs     12/31/20  1800 12/31/20  1800 01/01/21 0530 01/01/21 0530 01/01/21 1032 01/02/21 0517     --  138  --   --  142   K 3.8  --  4.4  --   --  3.7   CL 97  --  101  --   --  106   CO2 17*  --  19*  --   --  24   BUN 8  --  6  --   --  10   CREATININE 1.26*   < > 0.68   < > 0.9 0.86   GLUCOSE 364*  --  70  --   --  161*   CALCIUM 8.5  --  8.7  --   --  8.5   PROT 7.4  --   --   --   --  6.6   LABALBU 4.2  --   --   --   --  3.6   BILITOT <0.2  --   --   --   --  0.3   ALKPHOS 73  --   --   --   --  63   AST 28  --   --   --   --  13   ALT 19  --   --   --   --  13   LABGLOM 48.2*   < > >60.0   < > >60 >60.0   GFRAA 58.3*   < > >60.0   < > >60 >60.0   GLOB 3.2  --   --   --   --  3.0    < > = values in this interval not displayed.        MV Settings:     Vent Mode: (S) CPAP  Vt Ordered: 480 mL  Rate Set: 24 bmp  FiO2 : 35 %  PEEP/CPAP: (S) 5  Pressure Support: (S) 5 cmH20  Peak Inspiratory Pressure: 32 cmH2O  Mean Airway Pressure: 7.5 cmH20  I:E Ratio: 1:4.00    Recent Labs     01/01/21 1032   PHART 7.388   USX7YDR 45   PO2ART 72*   EXY8KVI 26.9   BEART 2   Y4FDGWTZ 94*       O2 Device: None (Room air)  O2 Flow Rate (L/min): 0 L/min    DIET GENERAL;     MEDICATIONS during current hospitalization:    Continuous Infusions:   dextrose         Scheduled Meds:   sodium chloride flush  10 mL Intravenous 2 times per day    piperacillin-tazobactam  3.375 g Intravenous Q8H    pantoprazole  40 mg Oral QAM AC    insulin lispro  0-12 Units Subcutaneous TID WC    insulin lispro  0-6 Units Subcutaneous Nightly    flumazenil  0.5 mg Intravenous Once  naloxone  0.4 mg Intravenous Once       PRN Meds:benzocaine-menthol, albuterol sulfate HFA, ipratropium, sodium chloride flush, promethazine **OR** ondansetron, polyethylene glycol, acetaminophen **OR** acetaminophen, glucose, dextrose, glucagon (rDNA), dextrose    Radiology  Cta Chest W Wo Contrast    Result Date: 1/1/2021  The EXAMINATION: CT scan of the chest with contrast (pulmonary embolism protocol) INDICATION: Ventilated patient. Hypoxia, tachycardia COMPARISON: None TECHNIQUE: Helical CT was performed through the chest utilizing 100 cc of Isovue-370 intravenous contrast.  Images were obtained with bolus tracking in order to opacify the pulmonary arteries. Both MIP and 3D volume rendered reconstructions were performed. FINDINGS: This is a suboptimal examination due to limited opacification. Within limits of the examination examination there are no gross central or gross proximal pulmonary emboli. There is areas of patchy to coalescent airspace disease, in the right upper lobe and left lower lobe with more dense infiltrate/consolidation seen right and left lower lobes. Air bronchograms are noted in both bases. No pleural effusions. No pneumothoraces. No significant mediastinal adenopathy. Incidental note is made of an aberrant right subclavian artery. This is a normal variant. In the field-of-view of the abdomen visualized abdominal contents are unremarkable. Osseous structures are intact. There is some debris layering dependently in the upper third of the esophagus which may represent a component of reflux.  Correlate clinically 1.  THIS IS A SUBOPTIMAL EXAMINATION DUE TO LIMITED OPACIFICATION. WITHIN LIMITS OF THE EXAMINATION EXAMINATION THERE ARE NO GROSS CENTRAL OR GROSS PROXIMAL PULMONARY EMBOLI. 2.  AREAS OF PATCHY TO COALESCENT AIRSPACE DISEASE IN THE RIGHT UPPER AND LEFT LOWER LOBE WITH MORE DENSE, INFILTRATE/CONSOLIDATION SEEN WITHIN RIGHT LOWER LOBE AND LEFT LOWER LOBE. Wava Prim All CT scans at this facility use dose modulation, iterative reconstruction, and/or weight based dosing when appropriate to reduce radiation dose to as low as reasonably achievable. Xr Chest Portable    Result Date: 1/1/2021  EXAMINATION: CHEST PORTABLE VIEW  CLINICAL HISTORY: Respiratory failure. Ventilated patient. COMPARISONS: December 31, 2020  FINDINGS: Single  views of the chest is submitted. There is an endotracheal tube. The tip lies at level clavicles. There is a nasogastric tube. The tip is not seen but does lie below hemidiaphragm. The cardiac silhouette is enlarged Pulmonary vascular unremarkable. Right sided trachea. Bibasilar infiltrates consolidations left greater than right with left pleural effusion. No Pneumothoraces. BIBASILAR INFILTRATES CONSOLIDATIONS LEFT GREATER THAN RIGHT WITH LEFT PLEURAL EFFUSION. Xr Chest Portable    Result Date: 12/31/2020  EXAMINATION: CHEST PORTABLE VIEW  CLINICAL HISTORY: Intubation COMPARISONS: None  FINDINGS: Single  views of the chest is submitted. There is an endotracheal tube. The tip lies level clavicles. There is a nasogastric tube. The tip is not seen but does lie below hemidiaphragm. The cardiac silhouette is of normal size configuration. Pulmonary vascular unremarkable. Right sided trachea. Bibasilar patchy to coalescent infiltrate/consolidation with right lower lobes. Right greater than left. No Pneumothoraces. BIBASILAR PATCHY TO COALESCENT INFILTRATE/CONSOLIDATION BOTH BASES RIGHT GREATER THAN LEFT. IMPRESSION AND SUGGESTION:  1. Acute hypoxic and hypercapnic respiratory failure requiring intubation status post extubation  2. Bilateral pneumonia secondary to aspiration  3. Altered mental status secondary to substance abuse, improved  4. Obesity , possible sleep apnea    Patient is extubated yesterday. She is afebrile though she is on IV Zosyn 3.375 g every 8 hourly. CT chest shows bibasilar infiltration with a decrease in the upper esophagus and likely aspiration pneumonia with change in mental status. Chest x-ray also showing bibasilar infiltration consolidation worse on left side then right with left pleural effusion. Advised incentive spirometer. Will check chest x-ray in a.m. PA and lateral.  Still not ready to discharge. .  Continue IV antibiotic for now. Discussed with RN. Will discuss with Dr. Puja Lee    I spent more than 35 min with this patient's care , greater the 50% of this time was spent in counseling and/or coordinating of care.     Electronically signed by Edin Oates MD, [facility] on 1/2/2021 at 10:46 AM

## 2021-01-03 ENCOUNTER — APPOINTMENT (OUTPATIENT)
Dept: GENERAL RADIOLOGY | Age: 36
DRG: 812 | End: 2021-01-03
Payer: COMMERCIAL

## 2021-01-03 VITALS
RESPIRATION RATE: 16 BRPM | BODY MASS INDEX: 39.44 KG/M2 | HEIGHT: 67 IN | TEMPERATURE: 98.2 F | SYSTOLIC BLOOD PRESSURE: 123 MMHG | WEIGHT: 251.3 LBS | HEART RATE: 69 BPM | OXYGEN SATURATION: 97 % | DIASTOLIC BLOOD PRESSURE: 62 MMHG

## 2021-01-03 LAB
ALBUMIN SERPL-MCNC: 3.7 G/DL (ref 3.5–4.6)
ALP BLD-CCNC: 51 U/L (ref 40–130)
ALT SERPL-CCNC: 11 U/L (ref 0–33)
ANION GAP SERPL CALCULATED.3IONS-SCNC: 9 MEQ/L (ref 9–15)
AST SERPL-CCNC: 11 U/L (ref 0–35)
BASOPHILS ABSOLUTE: 0.1 K/UL (ref 0–0.2)
BASOPHILS RELATIVE PERCENT: 1.2 %
BILIRUB SERPL-MCNC: 0.3 MG/DL (ref 0.2–0.7)
BUN BLDV-MCNC: 5 MG/DL (ref 6–20)
CALCIUM SERPL-MCNC: 8.3 MG/DL (ref 8.5–9.9)
CHLORIDE BLD-SCNC: 106 MEQ/L (ref 95–107)
CO2: 25 MEQ/L (ref 20–31)
CREAT SERPL-MCNC: 0.65 MG/DL (ref 0.5–0.9)
CULTURE, RESPIRATORY: NORMAL
EOSINOPHILS ABSOLUTE: 0.2 K/UL (ref 0–0.7)
EOSINOPHILS RELATIVE PERCENT: 2.7 %
GFR AFRICAN AMERICAN: >60
GFR NON-AFRICAN AMERICAN: >60
GLOBULIN: 2.2 G/DL (ref 2.3–3.5)
GLUCOSE BLD-MCNC: 110 MG/DL (ref 70–99)
GLUCOSE BLD-MCNC: 121 MG/DL (ref 60–115)
GLUCOSE BLD-MCNC: 208 MG/DL (ref 60–115)
GRAM STAIN RESULT: NORMAL
HCT VFR BLD CALC: 33.1 % (ref 37–47)
HEMOGLOBIN: 10.6 G/DL (ref 12–16)
LACTIC ACID: 1.3 MMOL/L (ref 0.5–2.2)
LYMPHOCYTES ABSOLUTE: 3.7 K/UL (ref 1–4.8)
LYMPHOCYTES RELATIVE PERCENT: 40.1 %
MCH RBC QN AUTO: 29 PG (ref 27–31.3)
MCHC RBC AUTO-ENTMCNC: 32 % (ref 33–37)
MCV RBC AUTO: 90.5 FL (ref 82–100)
MONOCYTES ABSOLUTE: 0.5 K/UL (ref 0.2–0.8)
MONOCYTES RELATIVE PERCENT: 5.6 %
NEUTROPHILS ABSOLUTE: 4.7 K/UL (ref 1.4–6.5)
NEUTROPHILS RELATIVE PERCENT: 50.4 %
PDW BLD-RTO: 14.3 % (ref 11.5–14.5)
PERFORMED ON: ABNORMAL
PERFORMED ON: ABNORMAL
PLATELET # BLD: 280 K/UL (ref 130–400)
POTASSIUM SERPL-SCNC: 3.7 MEQ/L (ref 3.4–4.9)
RBC # BLD: 3.66 M/UL (ref 4.2–5.4)
SODIUM BLD-SCNC: 140 MEQ/L (ref 135–144)
TOTAL PROTEIN: 5.9 G/DL (ref 6.3–8)
WBC # BLD: 9.3 K/UL (ref 4.8–10.8)

## 2021-01-03 PROCEDURE — 85025 COMPLETE CBC W/AUTO DIFF WBC: CPT

## 2021-01-03 PROCEDURE — 71046 X-RAY EXAM CHEST 2 VIEWS: CPT

## 2021-01-03 PROCEDURE — 36415 COLL VENOUS BLD VENIPUNCTURE: CPT

## 2021-01-03 PROCEDURE — 99232 SBSQ HOSP IP/OBS MODERATE 35: CPT | Performed by: INTERNAL MEDICINE

## 2021-01-03 PROCEDURE — 6370000000 HC RX 637 (ALT 250 FOR IP): Performed by: NURSE PRACTITIONER

## 2021-01-03 PROCEDURE — 6360000002 HC RX W HCPCS: Performed by: INTERNAL MEDICINE

## 2021-01-03 PROCEDURE — 83605 ASSAY OF LACTIC ACID: CPT

## 2021-01-03 PROCEDURE — 80053 COMPREHEN METABOLIC PANEL: CPT

## 2021-01-03 PROCEDURE — 2580000003 HC RX 258: Performed by: INTERNAL MEDICINE

## 2021-01-03 PROCEDURE — 6370000000 HC RX 637 (ALT 250 FOR IP): Performed by: INTERNAL MEDICINE

## 2021-01-03 RX ORDER — AMOXICILLIN AND CLAVULANATE POTASSIUM 875; 125 MG/1; MG/1
1 TABLET, FILM COATED ORAL EVERY 12 HOURS SCHEDULED
Status: DISCONTINUED | OUTPATIENT
Start: 2021-01-03 | End: 2021-01-03 | Stop reason: HOSPADM

## 2021-01-03 RX ADMIN — PANTOPRAZOLE SODIUM 40 MG: 40 TABLET, DELAYED RELEASE ORAL at 05:56

## 2021-01-03 RX ADMIN — PIPERACILLIN AND TAZOBACTAM 3.38 G: 3; .375 INJECTION, POWDER, LYOPHILIZED, FOR SOLUTION INTRAVENOUS at 05:56

## 2021-01-03 RX ADMIN — Medication 10 ML: at 10:09

## 2021-01-03 RX ADMIN — BENZOCAINE AND MENTHOL, UNSPECIFIED FORM 1 LOZENGE: 15; 3.6 LOZENGE ORAL at 05:56

## 2021-01-03 ASSESSMENT — ENCOUNTER SYMPTOMS
SHORTNESS OF BREATH: 0
COUGH: 0
NAUSEA: 0
DIARRHEA: 0

## 2021-01-03 ASSESSMENT — PAIN SCALES - GENERAL: PAINLEVEL_OUTOF10: 0

## 2021-01-03 NOTE — FLOWSHEET NOTE
A&OX4. Lung sounds are clear. No cough or SOB. NO c/o pain. Pt states she feels better and would like to go home today. 1240: D/C instructions/education given.  Pt voiced understanding

## 2021-01-03 NOTE — PROGRESS NOTES
INPATIENT PROGRESS NOTES    PATIENT NAME: Katya Parks  MRN: [id number]  SERVICE DATE:  January 3, 2021   SERVICE TIME:  11:51 AM      PRIMARY SERVICE: Pulmonary Disease    CHIEF COMPLAINTS: Pneumonia    INTERVAL HPI: Patient seen and examined at bedside, Interval Notes, orders reviewed. Nursing notes noted    Patient report feeling better, she is currently on room air, she denies chest pain, no coughing, slept well, no nausea no vomiting    Review of system:     GI Abdominal pain No  Skin Rash No    Social History     Tobacco Use    Smoking status: Current Every Day Smoker    Smokeless tobacco: Never Used   Substance Use Topics    Alcohol use: Not on file     No family history on file. OBJECTIVE    Body mass index is 39.36 kg/m². PHYSICAL EXAM:  Vitals:  /62   Pulse 69   Temp 98.2 °F (36.8 °C) (Oral)   Resp 16   Ht 5' 7\" (1.702 m)   Wt 251 lb 4.8 oz (114 kg)   SpO2 97%   BMI 39.36 kg/m²     General: alert, uncooperative, no distress  Head: normocephalic, atraumatic  Eyes:No gross abnormalities. ENT:  MMM no lesions  Neck:  supple and no masses  Chest : Few basilar rales, otherwise clear good air movement, no wheezing, nontender, tympanic  Heart[de-identified] Heart sounds are normal.  Regular rate and rhythm without murmur, gallop or rub. ABD:  symmetric, soft, non-tender  Musculoskeletal : no cyanosis, no clubbing and no edema  Neuro:  Grossly normal  Skin: No rashes or nodules noted.   Lymph node:  no cervical nodes  Urology: No Conrad   Psychiatric: appropriate    DATA:   Recent Labs     01/02/21  0517 01/03/21  0506   WBC 15.5* 9.3   HGB 10.7* 10.6*   HCT 32.9* 33.1*   MCV 90.0 90.5    280     Recent Labs     01/02/21  0517 01/03/21  0506    140   K 3.7 3.7    106   CO2 24 25   BUN 10 5*   CREATININE 0.86 0.65   GLUCOSE 161* 110*   CALCIUM 8.5 8.3*   PROT 6.6 5.9*   LABALBU 3.6 3.7   BILITOT 0.3 0.3   ALKPHOS 63 51   AST 13 11   ALT 13 11   LABGLOM >60.0 >60.0   GFRAA >60.0 >60.0 GLOB 3.0 2.2*       MV Settings:     Vent Mode: (S) CPAP  Vt Ordered: 480 mL  Rate Set: 24 bmp  FiO2 : 35 %  PEEP/CPAP: (S) 5  Pressure Support: (S) 5 cmH20  Peak Inspiratory Pressure: 32 cmH2O  Mean Airway Pressure: 7.5 cmH20  I:E Ratio: 1:4.00    Recent Labs     01/01/21  1032   PHART 7.388   MWH0OZO 45   PO2ART 72*   DYN0YCM 26.9   BEART 2   [id number] 94*       O2 Device: None (Room air)  O2 Flow Rate (L/min): 0 L/min    DIET GENERAL;     MEDICATIONS during current hospitalization:    Continuous Infusions:   dextrose         Scheduled Meds:   sodium chloride flush  10 mL Intravenous 2 times per day    piperacillin-tazobactam  3.375 g Intravenous Q8H    pantoprazole  40 mg Oral QAM AC    insulin lispro  0-12 Units Subcutaneous TID WC    insulin lispro  0-6 Units Subcutaneous Nightly    flumazenil  0.5 mg Intravenous Once    naloxone  0.4 mg Intravenous Once       PRN Meds:benzocaine-menthol, albuterol sulfate HFA, ipratropium, sodium chloride flush, promethazine **OR** ondansetron, polyethylene glycol, acetaminophen **OR** acetaminophen, glucose, dextrose, glucagon (rDNA), dextrose    Radiology  Xr Chest (2 Vw)    Result Date: 1/3/2021  XR CHEST (2 VW) : 1/3/2021 CLINICAL HISTORY:  Aspiration pneumonia . COMPARISON: Portable chest 1/1/2021 and chest CTA 12/31/2020. TECHNIQUE: ROUTINE FINDINGS: Endotracheal and orogastric tubes have been removed. Bibasilar infiltrate/consolidations noted on the recent prior studies appear resolved. There are no developing infiltrates, pleural effusion, cardiomegaly, vascular congestion, pneumothorax, or displaced fractures identified. NO EVIDENCE OF ACTIVE CARDIOPULMONARY DISEASE.      Cta Chest W Wo Contrast    Result Date: 1/1/2021 The EXAMINATION: CT scan of the chest with contrast (pulmonary embolism protocol) INDICATION: Ventilated patient. Hypoxia, tachycardia COMPARISON: None TECHNIQUE: Helical CT was performed through the chest utilizing 100 cc of Isovue-370 intravenous contrast.  Images were obtained with bolus tracking in order to opacify the pulmonary arteries. Both MIP and 3D volume rendered reconstructions were performed. FINDINGS: This is a suboptimal examination due to limited opacification. Within limits of the examination examination there are no gross central or gross proximal pulmonary emboli. There is areas of patchy to coalescent airspace disease, in the right upper lobe and left lower lobe with more dense infiltrate/consolidation seen right and left lower lobes. Air bronchograms are noted in both bases. No pleural effusions. No pneumothoraces. No significant mediastinal adenopathy. Incidental note is made of an aberrant right subclavian artery. This is a normal variant. In the field-of-view of the abdomen visualized abdominal contents are unremarkable. Osseous structures are intact. There is some debris layering dependently in the upper third of the esophagus which may represent a component of reflux. Correlate clinically     1. THIS IS A SUBOPTIMAL EXAMINATION DUE TO LIMITED OPACIFICATION. WITHIN LIMITS OF THE EXAMINATION EXAMINATION THERE ARE NO GROSS CENTRAL OR GROSS PROXIMAL PULMONARY EMBOLI. 2.  AREAS OF PATCHY TO COALESCENT AIRSPACE DISEASE IN THE RIGHT UPPER AND LEFT LOWER LOBE WITH MORE DENSE, INFILTRATE/CONSOLIDATION SEEN WITHIN RIGHT LOWER LOBE AND LEFT LOWER LOBE. Martina Parekh All CT scans at this facility use dose modulation, iterative reconstruction, and/or weight based dosing when appropriate to reduce radiation dose to as low as reasonably achievable.      Xr Chest Portable    Result Date: 1/1/2021 EXAMINATION: CHEST PORTABLE VIEW  CLINICAL HISTORY: Respiratory failure. Ventilated patient. COMPARISONS: December 31, 2020  FINDINGS: Single  views of the chest is submitted. There is an endotracheal tube. The tip lies at level clavicles. There is a nasogastric tube. The tip is not seen but does lie below hemidiaphragm. The cardiac silhouette is enlarged Pulmonary vascular unremarkable. Right sided trachea. Bibasilar infiltrates consolidations left greater than right with left pleural effusion. No Pneumothoraces. BIBASILAR INFILTRATES CONSOLIDATIONS LEFT GREATER THAN RIGHT WITH LEFT PLEURAL EFFUSION. Xr Chest Portable    Result Date: 12/31/2020  EXAMINATION: CHEST PORTABLE VIEW  CLINICAL HISTORY: Intubation COMPARISONS: None  FINDINGS: Single  views of the chest is submitted. There is an endotracheal tube. The tip lies level clavicles. There is a nasogastric tube. The tip is not seen but does lie below hemidiaphragm. The cardiac silhouette is of normal size configuration. Pulmonary vascular unremarkable. Right sided trachea. Bibasilar patchy to coalescent infiltrate/consolidation with right lower lobes. Right greater than left. No Pneumothoraces. BIBASILAR PATCHY TO COALESCENT INFILTRATE/CONSOLIDATION BOTH BASES RIGHT GREATER THAN LEFT.             IMPRESSION AND SUGGESTION:  Patient is at risk due to   · Bibasilar aspiration pneumonia  · Obesity  · Probable ABUNDIO      Recommendation    · Change to Augmentin, complete 8 days of treatment  · Incentive spirometer  · Follow-up with me in 3 to 4 weeks for sleep apnea evaluation  · Smoking cessation strongly recommended  · Maintain euvolemic  · Okay to DC home from pulmonary point of view         Electronically signed by Harman Canseco MD,  [facility]   on 1/3/2021 at 11:51 AM

## 2021-01-04 ENCOUNTER — CARE COORDINATION (OUTPATIENT)
Dept: CASE MANAGEMENT | Age: 36
End: 2021-01-04

## 2021-01-04 NOTE — CARE COORDINATION
[facility] Transitions Initial Follow Up Call    Call within 2 business days of discharge: Yes    Patient: Wilfredo Perez Patient : 1985   MRN: [id number]  Reason for Admission: -1/3/2021 Acute resp failure secondary to polysubstance OD (cocaine, PCP, alcohol). Discharge Date: 1/3/21 RARS: Readmission Risk Score: 19  NR   CV-19 neg. Needs PCP & pulmonary appts  Care Transitions    Care Transitions attempted initial discharge outreach. No VM opportunity. Will try again. Care Transitions 24 Hour Call    Care Transitions Interventions         Follow Up  No future appointments.     Sis Ladd RN

## 2021-01-05 ENCOUNTER — CARE COORDINATION (OUTPATIENT)
Dept: CASE MANAGEMENT | Age: 36
End: 2021-01-05

## 2021-01-05 DIAGNOSIS — J96.02 ACUTE RESPIRATORY FAILURE WITH HYPERCAPNIA (HCC): Primary | ICD-10-CM

## 2021-01-05 NOTE — CARE COORDINATION
[facility] Transitions Initial Follow Up Call    Call within 2 business days of discharge: Yes    Patient: Julian Garcia Patient : 1985   MRN: [id number]  Reason for Admission: -1/3/2021 Acute resp failure secondary to polysubstance OD (cocaine, PCP, alcohol). Discharge Date: 1/3/21 RARS: Readmission Risk Score: 19  NR   CV-19 neg. Needs pulmonary appt. Pt advised. [facility]  9:45. Attended. Med rec/1111F order completed. Care Transitions      Challenges to be reviewed by the provider   Additional needs identified to be addressed with provider No  none    Discussed COVID-19 related testing which was available at this time. Test results were negative. Patient informed of results, if available? Yes         Method of communication with provider : none    Advance Care Planning:   Does patient have an Advance Directive:  reviewed and current. Was this a readmission? No  Patient stated reason for admission: Resp failure  Patients top risk factors for readmission: ineffective coping and medical condition    Care Transition Nurse (CTN) contacted the patient by telephone to perform post hospital discharge assessment. Verified name and  with patient as identifiers. Provided introduction to self, and explanation of the CTN role. CTN reviewed discharge instructions, medical action plan and red flags with patient who verbalized understanding. Patient given an opportunity to ask questions and does not have any further questions or concerns at this time. Were discharge instructions available to patient? Yes. Reviewed appropriate site of care based on symptoms and resources available to patient including: Reviewed progressive resp symptoms to report and symptoms of CV-19 to report/go to ED. Reviewed smoking cessation. . The patient agrees to contact the PCP office for questions related to their healthcare.      Medication reconciliation was performed with patient, who verbalizes understanding of administration of home medications. Advised obtaining a 90-day supply of all daily and as-needed medications. Covid Risk Education    Patient has following risk factors of: smoker. Education provided regarding infection prevention, and signs and symptoms of COVID-19 and when to seek medical attention with patient who verbalized understanding. Discussed exposure protocols and quarantine From CDC: Are you at higher risk for severe illness?   and given an opportunity for questions and concerns. The patient agrees to contact the COVID-19 hotline [phone] or PCP office for questions related to COVID-19. For more information on steps you can take to protect yourself, see CDC's How to Protect Yourself     Discussed follow-up appointments. If no appointment was previously scheduled, appointment scheduling offered: [facility] appt attended today. Is follow up appointment scheduled within 7 days of discharge? Yes  [facility] follow up appointment(s):     Plan for follow-up call in 5-7 days based on severity of symptoms and risk factors. Plan for next call: symptom management-Resp tolerance, smoking cessation. CTN provided contact information for future needs. Pt reports she had [facility] appt today [facility]. Denies any current resp concerns. Has hoarse voice and states occasional cough. Denies fever, chills, or flu-like symptoms. Denies any home, DME, HHC, or med needs.  Reviewed smoking cessation and advised on developing quit smoking plan in advance, v/u.     Care Transitions 24 Hour Call    Do you have any ongoing symptoms?: No  Do you have a copy of your discharge instructions?: Yes  Do you have all of your prescriptions and are they filled?: Yes  Have you scheduled your follow up appointment?: Yes  Were you discharged with any Home Care or Post Acute Services: No  Do you feel like you have everything you need to keep you well at home?: Yes  Care Transitions Interventions         Follow Up  No future appointments.     Ruth Abreu RN

## 2021-01-12 ENCOUNTER — CARE COORDINATION (OUTPATIENT)
Dept: CASE MANAGEMENT | Age: 36
End: 2021-01-12

## 2021-01-12 NOTE — CARE COORDINATION
[facility] Transitions Follow Up Call    2021    Patient: Kunal Anderson  Patient : 1985   MRN: [id number]  Reason for Admission: -1/3/2021 Acute resp failure secondary to polysubstance OD  Discharge Date: 1/3/21 RARS: Readmission Risk Score: 19    Care Transitions unable to leave VM. Will try again. Care Transitions Subsequent and Final Call    Subsequent and Final Calls  Care Transitions Interventions  Other Interventions: Follow Up  No future appointments.     Basilio Gamboa RN

## 2021-01-22 ENCOUNTER — CARE COORDINATION (OUTPATIENT)
Dept: CASE MANAGEMENT | Age: 36
End: 2021-01-22

## 2021-01-22 NOTE — CARE COORDINATION
Care Transitions unable to reach for subsequent outreach as number no longer working.  CTN s/o.  //SP, RN CTN

## 2022-09-03 ENCOUNTER — HOSPITAL ENCOUNTER (EMERGENCY)
Age: 37
Discharge: HOME | End: 2022-09-03
Attending: EMERGENCY MEDICINE
Payer: COMMERCIAL

## 2022-09-03 VITALS
HEIGHT: 67 IN | HEART RATE: 84 BPM | BODY MASS INDEX: 39.39 KG/M2 | OXYGEN SATURATION: 98 % | SYSTOLIC BLOOD PRESSURE: 119 MMHG | WEIGHT: 251 LBS | TEMPERATURE: 98.3 F | DIASTOLIC BLOOD PRESSURE: 83 MMHG | RESPIRATION RATE: 16 BRPM

## 2022-09-03 DIAGNOSIS — Z13.9 ENCOUNTER FOR MEDICAL SCREENING EXAMINATION: Primary | ICD-10-CM

## 2022-09-03 LAB
ALBUMIN SERPL-MCNC: 4.5 G/DL (ref 3.5–4.6)
ALP BLD-CCNC: 51 U/L (ref 40–130)
ALT SERPL-CCNC: 15 U/L (ref 0–33)
ANION GAP SERPL CALCULATED.3IONS-SCNC: 9 MEQ/L (ref 9–15)
AST SERPL-CCNC: 20 U/L (ref 0–35)
BASOPHILS ABSOLUTE: 0.1 K/UL (ref 0–0.2)
BASOPHILS RELATIVE PERCENT: 1.3 %
BILIRUB SERPL-MCNC: 0.4 MG/DL (ref 0.2–0.7)
BUN BLDV-MCNC: 9 MG/DL (ref 6–20)
CALCIUM SERPL-MCNC: 8.8 MG/DL (ref 8.5–9.9)
CHLORIDE BLD-SCNC: 105 MEQ/L (ref 95–107)
CO2: 26 MEQ/L (ref 20–31)
CREAT SERPL-MCNC: 0.95 MG/DL (ref 0.5–0.9)
EOSINOPHILS ABSOLUTE: 0.2 K/UL (ref 0–0.7)
EOSINOPHILS RELATIVE PERCENT: 3.1 %
ETHANOL PERCENT: NORMAL G/DL
ETHANOL: <10 MG/DL (ref 0–0.08)
GFR AFRICAN AMERICAN: >60
GFR NON-AFRICAN AMERICAN: >60
GLOBULIN: 2.5 G/DL (ref 2.3–3.5)
GLUCOSE BLD-MCNC: 100 MG/DL (ref 70–99)
HCT VFR BLD CALC: 36.2 % (ref 37–47)
HEMOGLOBIN: 11.8 G/DL (ref 12–16)
LYMPHOCYTES ABSOLUTE: 2.2 K/UL (ref 1–4.8)
LYMPHOCYTES RELATIVE PERCENT: 33.3 %
MCH RBC QN AUTO: 29.4 PG (ref 27–31.3)
MCHC RBC AUTO-ENTMCNC: 32.6 % (ref 33–37)
MCV RBC AUTO: 90.1 FL (ref 82–100)
MONOCYTES ABSOLUTE: 0.6 K/UL (ref 0.2–0.8)
MONOCYTES RELATIVE PERCENT: 8.3 %
NEUTROPHILS ABSOLUTE: 3.6 K/UL (ref 1.4–6.5)
NEUTROPHILS RELATIVE PERCENT: 54 %
PDW BLD-RTO: 14.4 % (ref 11.5–14.5)
PLATELET # BLD: 267 K/UL (ref 130–400)
POTASSIUM SERPL-SCNC: 4.2 MEQ/L (ref 3.4–4.9)
RBC # BLD: 4.01 M/UL (ref 4.2–5.4)
SODIUM BLD-SCNC: 140 MEQ/L (ref 135–144)
TOTAL PROTEIN: 7 G/DL (ref 6.3–8)
WBC # BLD: 6.7 K/UL (ref 4.8–10.8)

## 2022-09-03 PROCEDURE — 36415 COLL VENOUS BLD VENIPUNCTURE: CPT

## 2022-09-03 PROCEDURE — 80053 COMPREHEN METABOLIC PANEL: CPT

## 2022-09-03 PROCEDURE — 85025 COMPLETE CBC W/AUTO DIFF WBC: CPT

## 2022-09-03 PROCEDURE — 99283 EMERGENCY DEPT VISIT LOW MDM: CPT

## 2022-09-03 PROCEDURE — 82077 ASSAY SPEC XCP UR&BREATH IA: CPT

## 2022-09-03 RX ORDER — 0.9 % SODIUM CHLORIDE 0.9 %
1000 INTRAVENOUS SOLUTION INTRAVENOUS ONCE
Status: DISCONTINUED | OUTPATIENT
Start: 2022-09-03 | End: 2022-09-03 | Stop reason: HOSPADM

## 2022-09-03 ASSESSMENT — LIFESTYLE VARIABLES
HOW OFTEN DO YOU HAVE A DRINK CONTAINING ALCOHOL: 2-3 TIMES A WEEK
HOW MANY STANDARD DRINKS CONTAINING ALCOHOL DO YOU HAVE ON A TYPICAL DAY: 3 OR 4

## 2022-09-03 ASSESSMENT — ENCOUNTER SYMPTOMS
SORE THROAT: 0
SHORTNESS OF BREATH: 0
ABDOMINAL PAIN: 0
PHOTOPHOBIA: 0
EYE DISCHARGE: 0
BACK PAIN: 0
VOMITING: 0
CHEST TIGHTNESS: 0
NAUSEA: 0
WHEEZING: 0

## 2022-09-03 ASSESSMENT — PAIN - FUNCTIONAL ASSESSMENT: PAIN_FUNCTIONAL_ASSESSMENT: NONE - DENIES PAIN
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
## 2022-09-03 NOTE — ED PROVIDER NOTES
[address] [facility] ED  eMERGENCY dEPARTMENT eNCOUnter      Pt Name: Nirali Thompson  MRN: [id number]  [place] 1985  Date of evaluation: 9/3/2022  Provider: Eliud Campoverde MD    CHIEF COMPLAINT       Chief Complaint   Patient presents with    Drug Overdose         HISTORY OF PRESENT ILLNESS   (Location/Symptom, Timing/Onset,Context/Setting, Quality, Duration, Modifying Factors, Severity)  Note limiting factors. Nirali Thompson is a 40 y.o. female who presents to the emergency department for evaluation of drug use and intoxication. Patient was found laying on the sidewalk near the street and brought in for evaluation. She admits to alcohol use. There was apparently a crack pipe near the patient. She has no physical complaints. She falls asleep easily when not stimulated. No signs of injury. HPI    NursingNotes were reviewed. REVIEW OF SYSTEMS    (2-9 systems for level 4, 10 or more for level 5)     Review of Systems   Constitutional:  Negative for fever. HENT:  Negative for congestion, ear pain, mouth sores and sore throat. Eyes:  Negative for photophobia and discharge. Respiratory:  Negative for chest tightness, shortness of breath and wheezing. Cardiovascular:  Negative for chest pain and palpitations. Gastrointestinal:  Negative for abdominal pain, nausea and vomiting. Genitourinary:  Negative for dysuria. Musculoskeletal:  Negative for back pain. Skin:  Negative for rash. Psychiatric/Behavioral:  Negative for agitation. All other systems reviewed and are negative. Except as noted above the remainder of the review of systems was reviewed and negative. PAST MEDICAL HISTORY   No past medical history on file. SURGICALHISTORY     No past surgical history on file.       CURRENT MEDICATIONS       Previous Medications    METFORMIN (GLUCOPHAGE) 500 MG TABLET    Take 1 tablet by mouth 2 times daily (with meals)    PANTOPRAZOLE (PROTONIX) 40 MG TABLET    Take 1 tablet by mouth every morning (before breakfast)       ALLERGIES     Patient has no known allergies. FAMILY HISTORY     No family history on file. SOCIAL HISTORY       Social History     Socioeconomic History    Marital status: Single   Tobacco Use    Smoking status: Every Day    Smokeless tobacco: Never       SCREENINGS    Kristen Coma Scale  Eye Opening: Spontaneous  Best Verbal Response: Confused  Best Motor Response: Obeys commands  Kristen Coma Scale Score: 14 @FLOW([id number])@      PHYSICAL EXAM    (up to 7 for level 4, 8 or more for level 5)     ED Triage Vitals [09/03/22 0217]   BP Temp Temp Source Heart Rate Resp SpO2 Height Weight   119/83 98.3 °F (36.8 °C) Oral 84 16 98 % 5' 7\" (1.702 m) 251 lb (113.9 kg)       Physical Exam    DIAGNOSTIC RESULTS     EKG: All EKG's are interpreted by the Emergency Department Physician who either signs or Co-signsthis chart in the absence of a cardiologist.      RADIOLOGY:   Non-plain filmimages such as CT, Ultrasound and MRI are read by the radiologist. Plain radiographic images are visualized and preliminarily interpreted by the emergency physician with the below findings:      Interpretation per the Radiologist below, if available at the time ofthis note:    No orders to display         ED BEDSIDE ULTRASOUND:   Performed by ED Physician - none    LABS:  Labs Reviewed   COMPREHENSIVE METABOLIC PANEL - Abnormal; Notable for the following components:       Result Value    Glucose 100 (*)     Creatinine 0.95 (*)     All other components within normal limits   CBC WITH AUTO DIFFERENTIAL - Abnormal; Notable for the following components:    RBC 4.01 (*)     Hemoglobin 11.8 (*)     Hematocrit 36.2 (*)     MCHC 32.6 (*)     All other components within normal limits   ETHANOL       All other labs were within normal range or not returned as of this dictation.     EMERGENCY DEPARTMENT COURSE and DIFFERENTIAL DIAGNOSIS/MDM:   Vitals:    Vitals:    09/03/22 0217   BP: 119/83 Pulse: 84   Resp: 16   Temp: 98.3 °F (36.8 °C)   TempSrc: Oral   SpO2: 98%   Weight: 251 lb (113.9 kg)   Height: 5' 7\" (1.702 m)          MDM    Patient had normal vital signs throughout her stay here. She is arousable and will answer questions being alert and orient x3. I tried to place an IV to give her some fluids and she pulled that out on her own. She had a negative alcohol level. She is medically cleared now. She will be picked up by [facility] for a warrant    CONSULTS:  None    PROCEDURES:  Unless otherwise noted below, none     Procedures    FINAL IMPRESSION      1. Encounter for medical screening examination          DISPOSITION/PLAN   DISPOSITION Decision To Discharge 09/03/2022 03:40:33 AM      PATIENT REFERRED TO:  No follow-up provider specified.     DISCHARGE MEDICATIONS:  New Prescriptions    No medications on file          (Please note that portions of this note were completed with a voice recognition program.  Efforts were made to edit the dictations but occasionally words are mis-transcribed.)    Neha Henderson MD (electronically signed)  Attending Emergency Physician         Neha Henderson MD  09/03/22 0340       Neha Henderson MD  09/03/22 8506

## 2023-11-16 NOTE — PROGRESS NOTES
Chronic ongoing problem, Prozac has helped but experiencing some weight gain, will try adding bupropion 75 mg twice daily and continue fluoxetine 30 mg daily.   Progress Note  Date:1/3/2021       [facility]  Patient Keegan Goff     YOB: 1985     Age:35 y.o. Subjective    Subjective:  Symptoms:  No shortness of breath, malaise, cough, chest pain, weakness, headache, chest pressure, anorexia, diarrhea or anxiety. Diet:  No nausea. Review of Systems   Respiratory: Negative for cough and shortness of breath. Cardiovascular: Negative for chest pain. Gastrointestinal: Negative for anorexia, diarrhea and nausea. Neurological: Negative for weakness. Objective         Vitals Last 24 Hours:  TEMPERATURE:  Temp  Av.2 °F (36.8 °C)  Min: 98.1 °F (36.7 °C)  Max: 98.2 °F (36.8 °C)  RESPIRATIONS RANGE: Resp  Av  Min: 16  Max: 18  PULSE OXIMETRY RANGE: SpO2  Av.5 %  Min: 97 %  Max: 98 %  PULSE RANGE: Pulse  Av.5  Min: 69  Max: 80  BLOOD PRESSURE RANGE: Systolic (56KMY), WOJ:479 , Min:123 , KXT:381   ; Diastolic (31TZE), GFE:53, Min:62, Max:69    I/O (24Hr): Intake/Output Summary (Last 24 hours) at 1/3/2021 1044  Last data filed at 1/3/2021 0917  Gross per 24 hour   Intake 910 ml   Output    Net 910 ml     Objective:  General Appearance:  Comfortable, well-appearing and in no acute distress. Vital signs: (most recent): Blood pressure 123/62, pulse 69, temperature 98.2 °F (36.8 °C), temperature source Oral, resp. rate 16, height 5' 7\" (1.702 m), weight 251 lb 4.8 oz (114 kg), SpO2 97 %. HEENT: Normal HEENT exam.    Lungs:  Normal effort. Heart: Regular rhythm. S1 normal.    Abdomen: Abdomen is soft. Bowel sounds are normal.   There is no epigastric area or suprapubic area tenderness. Pulses: Distal pulses are intact. Neurological: Patient is alert. Pupils:  Pupils are equal, round, and reactive to light. Skin:  Warm and dry.       Labs/Imaging/Diagnostics    Labs:  CBC:  Recent Labs     21  0530 21  0530 21  1032 21  0517 21  8594 WBC 14.5*  --   --  15.5* 9.3   RBC 4.23  --   --  3.66* 3.66*   HGB 12.2   < > 12.8 10.7* 10.6*   HCT 38.9  --   --  32.9* 33.1*   MCV 91.9  --   --  90.0 90.5   RDW 14.7*  --   --  14.1 14.3     --   --  294 280    < > = values in this interval not displayed. CHEMISTRIES:  Recent Labs     12/31/20  1800 12/31/20  1800 01/01/21  0530 01/01/21  0530 01/01/21  1032 01/02/21  0517 01/03/21  0506     --  138  --   --  142 140   K 3.8  --  4.4  --   --  3.7 3.7   CL 97  --  101  --   --  106 106   CO2 17*  --  19*  --   --  24 25   BUN 8  --  6  --   --  10 5*   CREATININE 1.26*   < > 0.68   < > 0.9 0.86 0.65   GLUCOSE 364*  --  70  --   --  161* 110*   MG 2.4  --   --   --   --   --   --     < > = values in this interval not displayed. PT/INR:  Recent Labs     12/31/20  1800   PROTIME 14.6   INR 1.1     APTT:  Recent Labs     12/31/20  1800   APTT 25.4     LIVER PROFILE:  Recent Labs     12/31/20  1800 01/02/21  0517 01/03/21  0506   AST 28 13 11   ALT 19 13 11   BILITOT <0.2 0.3 0.3   ALKPHOS 73 63 51       Imaging Last 24 Hours:  Xr Chest (2 Vw)    Result Date: 1/3/2021  XR CHEST (2 VW) : 1/3/2021 CLINICAL HISTORY:  Aspiration pneumonia . COMPARISON: Portable chest 1/1/2021 and chest CTA 12/31/2020. TECHNIQUE: ROUTINE FINDINGS: Endotracheal and orogastric tubes have been removed. Bibasilar infiltrate/consolidations noted on the recent prior studies appear resolved. There are no developing infiltrates, pleural effusion, cardiomegaly, vascular congestion, pneumothorax, or displaced fractures identified. NO EVIDENCE OF ACTIVE CARDIOPULMONARY DISEASE.      Assessment//Plan           Hospital Problems           Last Modified POA    * (Principal) Acute respiratory failure with hypercapnia ([facility] [address] 12/31/2020 Yes    Polysubstance abuse ([facility] [address] 12/31/2020 Yes    Aspiration pneumonia ([facility] [address] 12/31/2020 Yes    GI bleed 12/31/2020 Yes        Assessment & Plan  1) Aspiration PNA improving/ on po abx 2) GI bleed resolved  3) polysubstance   Counseling given  4) DVT ppx  5) DM2 pt states she is on metformin at home  Resume metformin  Electronically signed by Roxanna Ford MD on 1/3/21 at 10:44 AM EST

## 2024-05-21 NOTE — PROGRESS NOTES
Assessment complete; pt alert and oriented X4, calm and cooperative. Lung sounds clear, heart sounds normal, bowel sounds active in all four quadrants. Abdomen is soft and non tender. Pt denies chest pain, SOB, headache, nausea or vomiting. VSS.  Pt denies any further needs at this time, will continue to monitor Electronically signed by Terri Osorio RN on 1/3/2021 at 2:57 AM Patient BP 91/50